# Patient Record
Sex: MALE | Race: WHITE | Employment: OTHER | URBAN - METROPOLITAN AREA
[De-identification: names, ages, dates, MRNs, and addresses within clinical notes are randomized per-mention and may not be internally consistent; named-entity substitution may affect disease eponyms.]

---

## 2017-01-04 ENCOUNTER — OFFICE VISIT (OUTPATIENT)
Dept: INTERNAL MEDICINE CLINIC | Age: 82
End: 2017-01-04

## 2017-01-04 VITALS
DIASTOLIC BLOOD PRESSURE: 74 MMHG | BODY MASS INDEX: 17 KG/M2 | HEIGHT: 65 IN | SYSTOLIC BLOOD PRESSURE: 120 MMHG | WEIGHT: 102 LBS

## 2017-01-04 DIAGNOSIS — E78.5 HYPERLIPIDEMIA, UNSPECIFIED HYPERLIPIDEMIA TYPE: ICD-10-CM

## 2017-01-04 DIAGNOSIS — M54.41 CHRONIC LOW BACK PAIN WITH RIGHT-SIDED SCIATICA, UNSPECIFIED BACK PAIN LATERALITY: ICD-10-CM

## 2017-01-04 DIAGNOSIS — J40 BRONCHITIS: ICD-10-CM

## 2017-01-04 DIAGNOSIS — I25.10 CORONARY ARTERY DISEASE INVOLVING NATIVE CORONARY ARTERY OF NATIVE HEART WITHOUT ANGINA PECTORIS: ICD-10-CM

## 2017-01-04 DIAGNOSIS — Z00.00 MEDICARE ANNUAL WELLNESS VISIT, INITIAL: Primary | ICD-10-CM

## 2017-01-04 DIAGNOSIS — J44.9 CHRONIC OBSTRUCTIVE PULMONARY DISEASE, UNSPECIFIED COPD TYPE (HCC): ICD-10-CM

## 2017-01-04 DIAGNOSIS — G89.29 CHRONIC LOW BACK PAIN WITH RIGHT-SIDED SCIATICA, UNSPECIFIED BACK PAIN LATERALITY: ICD-10-CM

## 2017-01-04 PROCEDURE — G0438 PPPS, INITIAL VISIT: HCPCS | Performed by: NURSE PRACTITIONER

## 2017-01-04 RX ORDER — PREDNISONE 10 MG/1
TABLET ORAL
Qty: 39 TABLET | Refills: 0 | Status: SHIPPED | OUTPATIENT
Start: 2017-01-04 | End: 2017-01-31 | Stop reason: ALTCHOICE

## 2017-01-04 ASSESSMENT — LIFESTYLE VARIABLES: HOW OFTEN DO YOU HAVE A DRINK CONTAINING ALCOHOL: 0

## 2017-01-18 ENCOUNTER — TELEPHONE (OUTPATIENT)
Dept: PULMONOLOGY | Age: 82
End: 2017-01-18

## 2017-01-24 ENCOUNTER — CARE COORDINATION (OUTPATIENT)
Dept: CARE COORDINATION | Age: 82
End: 2017-01-24

## 2017-01-30 ENCOUNTER — HOSPITAL ENCOUNTER (OUTPATIENT)
Dept: GENERAL RADIOLOGY | Age: 82
Discharge: HOME | End: 2017-01-30
Attending: INTERNAL MEDICINE | Admitting: INTERNAL MEDICINE

## 2017-01-30 LAB
A/G RATIO: 1.2 (ref 1.1–2.2)
ALBUMIN SERPL-MCNC: 4 G/DL (ref 3.4–5)
ALP BLD-CCNC: 69 U/L (ref 40–129)
ALT SERPL-CCNC: 15 U/L (ref 10–40)
ANION GAP SERPL CALCULATED.3IONS-SCNC: 15 MMOL/L (ref 3–16)
AST SERPL-CCNC: 19 U/L (ref 15–37)
BILIRUB SERPL-MCNC: 0.4 MG/DL (ref 0–1)
BUN BLDV-MCNC: 15 MG/DL (ref 7–20)
CALCIUM SERPL-MCNC: 9.7 MG/DL (ref 8.3–10.6)
CHLORIDE BLD-SCNC: 100 MMOL/L (ref 99–110)
CHOLESTEROL, TOTAL: 186 MG/DL (ref 0–199)
CO2: 27 MMOL/L (ref 21–32)
CREAT SERPL-MCNC: 0.8 MG/DL (ref 0.8–1.3)
GFR AFRICAN AMERICAN: >60
GFR NON-AFRICAN AMERICAN: >60
GLOBULIN: 3.3 G/DL
GLUCOSE BLD-MCNC: 91 MG/DL (ref 70–99)
HDLC SERPL-MCNC: 44 MG/DL (ref 40–60)
LDL CHOLESTEROL CALCULATED: 121 MG/DL
POTASSIUM SERPL-SCNC: 4.3 MMOL/L (ref 3.5–5.1)
SODIUM BLD-SCNC: 142 MMOL/L (ref 136–145)
T4 FREE: 1.3 NG/DL (ref 0.9–1.8)
TOTAL PROTEIN: 7.3 G/DL (ref 6.4–8.2)
TRIGL SERPL-MCNC: 104 MG/DL (ref 0–150)
TSH SERPL DL<=0.05 MIU/L-ACNC: 1.03 UIU/ML (ref 0.27–4.2)
VLDLC SERPL CALC-MCNC: 21 MG/DL

## 2017-01-31 ENCOUNTER — CARE COORDINATOR VISIT (OUTPATIENT)
Dept: CARE COORDINATION | Age: 82
End: 2017-01-31

## 2017-01-31 ENCOUNTER — OFFICE VISIT (OUTPATIENT)
Dept: INTERNAL MEDICINE CLINIC | Age: 82
End: 2017-01-31

## 2017-01-31 VITALS
WEIGHT: 103 LBS | SYSTOLIC BLOOD PRESSURE: 118 MMHG | HEART RATE: 75 BPM | BODY MASS INDEX: 17.16 KG/M2 | DIASTOLIC BLOOD PRESSURE: 58 MMHG | OXYGEN SATURATION: 95 % | HEIGHT: 65 IN

## 2017-01-31 DIAGNOSIS — I25.10 CORONARY ARTERY DISEASE INVOLVING NATIVE CORONARY ARTERY OF NATIVE HEART WITHOUT ANGINA PECTORIS: ICD-10-CM

## 2017-01-31 DIAGNOSIS — Z23 NEED FOR PROPHYLACTIC VACCINATION AGAINST STREPTOCOCCUS PNEUMONIAE (PNEUMOCOCCUS): ICD-10-CM

## 2017-01-31 DIAGNOSIS — F17.200 TOBACCO DEPENDENCE: ICD-10-CM

## 2017-01-31 DIAGNOSIS — E78.5 HYPERLIPIDEMIA, UNSPECIFIED HYPERLIPIDEMIA TYPE: ICD-10-CM

## 2017-01-31 DIAGNOSIS — M54.41 CHRONIC LOW BACK PAIN WITH RIGHT-SIDED SCIATICA, UNSPECIFIED BACK PAIN LATERALITY: ICD-10-CM

## 2017-01-31 DIAGNOSIS — G89.29 CHRONIC LOW BACK PAIN WITH RIGHT-SIDED SCIATICA, UNSPECIFIED BACK PAIN LATERALITY: ICD-10-CM

## 2017-01-31 DIAGNOSIS — J44.9 CHRONIC OBSTRUCTIVE PULMONARY DISEASE, UNSPECIFIED COPD TYPE (HCC): Primary | ICD-10-CM

## 2017-01-31 DIAGNOSIS — E04.1 THYROID NODULE: ICD-10-CM

## 2017-01-31 PROCEDURE — 99213 OFFICE O/P EST LOW 20 MIN: CPT | Performed by: INTERNAL MEDICINE

## 2017-01-31 PROCEDURE — G0009 ADMIN PNEUMOCOCCAL VACCINE: HCPCS | Performed by: INTERNAL MEDICINE

## 2017-01-31 PROCEDURE — 90732 PPSV23 VACC 2 YRS+ SUBQ/IM: CPT | Performed by: INTERNAL MEDICINE

## 2017-02-02 ENCOUNTER — HOSPITAL ENCOUNTER (OUTPATIENT)
Dept: ULTRASOUND IMAGING | Age: 82
Discharge: HOME | End: 2017-02-02
Attending: INTERNAL MEDICINE | Admitting: INTERNAL MEDICINE

## 2017-02-02 DIAGNOSIS — E04.1 THYROID NODULE: ICD-10-CM

## 2017-02-02 DIAGNOSIS — E04.1 NONTOXIC SINGLE THYROID NODULE: ICD-10-CM

## 2017-02-20 ENCOUNTER — CARE COORDINATION (OUTPATIENT)
Dept: CARE COORDINATION | Age: 82
End: 2017-02-20

## 2017-02-23 ENCOUNTER — CARE COORDINATION (OUTPATIENT)
Dept: CARE COORDINATION | Age: 82
End: 2017-02-23

## 2017-02-23 ENCOUNTER — OFFICE VISIT (OUTPATIENT)
Dept: INTERNAL MEDICINE CLINIC | Age: 82
End: 2017-02-23

## 2017-02-23 VITALS
DIASTOLIC BLOOD PRESSURE: 70 MMHG | BODY MASS INDEX: 17.66 KG/M2 | HEART RATE: 94 BPM | HEIGHT: 65 IN | SYSTOLIC BLOOD PRESSURE: 138 MMHG | WEIGHT: 106 LBS | OXYGEN SATURATION: 94 %

## 2017-02-23 DIAGNOSIS — M25.512 ACUTE PAIN OF LEFT SHOULDER: Primary | ICD-10-CM

## 2017-02-23 DIAGNOSIS — I25.10 CORONARY ARTERY DISEASE INVOLVING NATIVE CORONARY ARTERY OF NATIVE HEART WITHOUT ANGINA PECTORIS: ICD-10-CM

## 2017-02-23 DIAGNOSIS — J44.1 COPD WITH ACUTE EXACERBATION (HCC): ICD-10-CM

## 2017-02-23 PROCEDURE — 99213 OFFICE O/P EST LOW 20 MIN: CPT | Performed by: INTERNAL MEDICINE

## 2017-02-23 RX ORDER — METHYLPREDNISOLONE 4 MG/1
TABLET ORAL
Qty: 1 KIT | Refills: 0 | Status: SHIPPED | OUTPATIENT
Start: 2017-02-23 | End: 2017-03-01

## 2017-02-23 RX ORDER — MELOXICAM 15 MG/1
15 TABLET ORAL DAILY
Qty: 30 TABLET | Refills: 0 | Status: SHIPPED | OUTPATIENT
Start: 2017-02-23 | End: 2017-05-01 | Stop reason: SDUPTHER

## 2017-03-01 ENCOUNTER — TELEPHONE (OUTPATIENT)
Dept: ORTHOPEDIC SURGERY | Age: 82
End: 2017-03-01

## 2017-03-17 ENCOUNTER — CARE COORDINATION (OUTPATIENT)
Dept: CARE COORDINATION | Age: 82
End: 2017-03-17

## 2017-04-26 ENCOUNTER — CARE COORDINATION (OUTPATIENT)
Dept: CARE COORDINATION | Age: 82
End: 2017-04-26

## 2017-05-01 ENCOUNTER — OFFICE VISIT (OUTPATIENT)
Dept: INTERNAL MEDICINE CLINIC | Age: 82
End: 2017-05-01

## 2017-05-01 VITALS
DIASTOLIC BLOOD PRESSURE: 68 MMHG | WEIGHT: 103 LBS | HEART RATE: 70 BPM | BODY MASS INDEX: 17.16 KG/M2 | OXYGEN SATURATION: 96 % | SYSTOLIC BLOOD PRESSURE: 110 MMHG | HEIGHT: 65 IN

## 2017-05-01 DIAGNOSIS — M54.16 LUMBAR RADICULOPATHY, ACUTE: Primary | ICD-10-CM

## 2017-05-01 DIAGNOSIS — F17.200 TOBACCO DEPENDENCE: ICD-10-CM

## 2017-05-01 DIAGNOSIS — E78.5 HYPERLIPIDEMIA, UNSPECIFIED HYPERLIPIDEMIA TYPE: ICD-10-CM

## 2017-05-01 DIAGNOSIS — J44.9 CHRONIC OBSTRUCTIVE PULMONARY DISEASE, UNSPECIFIED COPD TYPE (HCC): ICD-10-CM

## 2017-05-01 DIAGNOSIS — I25.10 CORONARY ARTERY DISEASE INVOLVING NATIVE CORONARY ARTERY OF NATIVE HEART WITHOUT ANGINA PECTORIS: ICD-10-CM

## 2017-05-01 PROCEDURE — 99214 OFFICE O/P EST MOD 30 MIN: CPT | Performed by: INTERNAL MEDICINE

## 2017-05-01 RX ORDER — MELOXICAM 15 MG/1
15 TABLET ORAL DAILY
Qty: 30 TABLET | Refills: 1 | Status: SHIPPED | OUTPATIENT
Start: 2017-05-01 | End: 2018-04-17

## 2017-05-01 RX ORDER — PREDNISONE 10 MG/1
TABLET ORAL
Qty: 40 TABLET | Refills: 0 | Status: SHIPPED | OUTPATIENT
Start: 2017-05-01 | End: 2017-07-14 | Stop reason: ALTCHOICE

## 2017-07-14 ENCOUNTER — HOSPITAL ENCOUNTER (OUTPATIENT)
Dept: OTHER | Age: 82
Discharge: HOME | End: 2017-07-14
Attending: INTERNAL MEDICINE | Admitting: INTERNAL MEDICINE

## 2017-07-14 ENCOUNTER — OFFICE VISIT (OUTPATIENT)
Dept: PULMONOLOGY | Age: 82
End: 2017-07-14

## 2017-07-14 VITALS
DIASTOLIC BLOOD PRESSURE: 61 MMHG | RESPIRATION RATE: 16 BRPM | HEART RATE: 72 BPM | WEIGHT: 98 LBS | SYSTOLIC BLOOD PRESSURE: 116 MMHG | OXYGEN SATURATION: 95 % | BODY MASS INDEX: 16.33 KG/M2 | TEMPERATURE: 97 F | HEIGHT: 65 IN

## 2017-07-14 DIAGNOSIS — M25.559 ARTHRALGIA OF HIP, UNSPECIFIED LATERALITY: ICD-10-CM

## 2017-07-14 DIAGNOSIS — J43.2 CENTRILOBULAR EMPHYSEMA (HCC): ICD-10-CM

## 2017-07-14 DIAGNOSIS — M25.559 ARTHRALGIA OF HIP, UNSPECIFIED LATERALITY: Primary | ICD-10-CM

## 2017-07-14 PROCEDURE — 99214 OFFICE O/P EST MOD 30 MIN: CPT | Performed by: INTERNAL MEDICINE

## 2017-08-01 ENCOUNTER — OFFICE VISIT (OUTPATIENT)
Dept: INTERNAL MEDICINE CLINIC | Age: 82
End: 2017-08-01

## 2017-08-01 VITALS
OXYGEN SATURATION: 95 % | HEIGHT: 65 IN | HEART RATE: 71 BPM | BODY MASS INDEX: 16.43 KG/M2 | DIASTOLIC BLOOD PRESSURE: 50 MMHG | WEIGHT: 98.6 LBS | SYSTOLIC BLOOD PRESSURE: 100 MMHG

## 2017-08-01 DIAGNOSIS — I25.10 CORONARY ARTERY DISEASE INVOLVING NATIVE CORONARY ARTERY OF NATIVE HEART WITHOUT ANGINA PECTORIS: ICD-10-CM

## 2017-08-01 DIAGNOSIS — K59.00 CONSTIPATION, UNSPECIFIED CONSTIPATION TYPE: ICD-10-CM

## 2017-08-01 DIAGNOSIS — J44.9 CHRONIC OBSTRUCTIVE PULMONARY DISEASE, UNSPECIFIED COPD TYPE (HCC): Primary | ICD-10-CM

## 2017-08-01 DIAGNOSIS — E78.5 HYPERLIPIDEMIA, UNSPECIFIED HYPERLIPIDEMIA TYPE: ICD-10-CM

## 2017-08-01 DIAGNOSIS — F17.200 TOBACCO DEPENDENCE: ICD-10-CM

## 2017-08-01 DIAGNOSIS — K21.9 GASTROESOPHAGEAL REFLUX DISEASE WITHOUT ESOPHAGITIS: ICD-10-CM

## 2017-08-01 DIAGNOSIS — M54.41 ACUTE RIGHT-SIDED LOW BACK PAIN WITH RIGHT-SIDED SCIATICA: ICD-10-CM

## 2017-08-01 PROCEDURE — 99214 OFFICE O/P EST MOD 30 MIN: CPT | Performed by: INTERNAL MEDICINE

## 2017-08-04 ENCOUNTER — HOSPITAL ENCOUNTER (OUTPATIENT)
Dept: GENERAL RADIOLOGY | Age: 82
Discharge: HOME | End: 2017-08-04
Attending: INTERNAL MEDICINE | Admitting: INTERNAL MEDICINE

## 2017-08-04 LAB
A/G RATIO: 1.3 (ref 1.1–2.2)
ALBUMIN SERPL-MCNC: 4.1 G/DL (ref 3.4–5)
ALP BLD-CCNC: 59 U/L (ref 40–129)
ALT SERPL-CCNC: 10 U/L (ref 10–40)
ANION GAP SERPL CALCULATED.3IONS-SCNC: 12 MMOL/L (ref 3–16)
AST SERPL-CCNC: 15 U/L (ref 15–37)
BILIRUB SERPL-MCNC: 0.4 MG/DL (ref 0–1)
BUN BLDV-MCNC: 17 MG/DL (ref 7–20)
CALCIUM SERPL-MCNC: 9.1 MG/DL (ref 8.3–10.6)
CHLORIDE BLD-SCNC: 101 MMOL/L (ref 99–110)
CHOLESTEROL, TOTAL: 185 MG/DL (ref 0–199)
CO2: 27 MMOL/L (ref 21–32)
CREAT SERPL-MCNC: 0.7 MG/DL (ref 0.8–1.3)
GFR AFRICAN AMERICAN: >60
GFR NON-AFRICAN AMERICAN: >60
GLOBULIN: 3.2 G/DL
GLUCOSE BLD-MCNC: 96 MG/DL (ref 70–99)
HDLC SERPL-MCNC: 39 MG/DL (ref 40–60)
LDL CHOLESTEROL CALCULATED: 127 MG/DL
POTASSIUM SERPL-SCNC: 4.2 MMOL/L (ref 3.5–5.1)
SODIUM BLD-SCNC: 140 MMOL/L (ref 136–145)
TOTAL PROTEIN: 7.3 G/DL (ref 6.4–8.2)
TRIGL SERPL-MCNC: 96 MG/DL (ref 0–150)
VLDLC SERPL CALC-MCNC: 19 MG/DL

## 2017-08-14 ENCOUNTER — OFFICE VISIT (OUTPATIENT)
Dept: ORTHOPEDIC SURGERY | Age: 82
End: 2017-08-14

## 2017-08-14 VITALS — WEIGHT: 98.55 LBS | HEIGHT: 65 IN | BODY MASS INDEX: 16.42 KG/M2

## 2017-08-14 DIAGNOSIS — M51.36 DDD (DEGENERATIVE DISC DISEASE), LUMBAR: ICD-10-CM

## 2017-08-14 DIAGNOSIS — M54.16 LUMBAR RADICULITIS: ICD-10-CM

## 2017-08-14 DIAGNOSIS — M54.5 LOW BACK PAIN, UNSPECIFIED BACK PAIN LATERALITY, UNSPECIFIED CHRONICITY, WITH SCIATICA PRESENCE UNSPECIFIED: Primary | ICD-10-CM

## 2017-08-14 PROCEDURE — 99214 OFFICE O/P EST MOD 30 MIN: CPT | Performed by: PHYSICIAN ASSISTANT

## 2017-08-14 PROCEDURE — 72100 X-RAY EXAM L-S SPINE 2/3 VWS: CPT | Performed by: PHYSICIAN ASSISTANT

## 2017-08-18 ENCOUNTER — TELEPHONE (OUTPATIENT)
Dept: ORTHOPEDIC SURGERY | Age: 82
End: 2017-08-18

## 2017-08-21 ENCOUNTER — HOSPITAL ENCOUNTER (OUTPATIENT)
Dept: PAIN MANAGEMENT | Age: 82
Discharge: HOME | End: 2017-08-21
Attending: PHYSICAL MEDICINE & REHABILITATION | Admitting: PHYSICAL MEDICINE & REHABILITATION

## 2017-08-21 VITALS
SYSTOLIC BLOOD PRESSURE: 135 MMHG | HEART RATE: 62 BPM | DIASTOLIC BLOOD PRESSURE: 59 MMHG | RESPIRATION RATE: 16 BRPM | OXYGEN SATURATION: 98 % | TEMPERATURE: 97.6 F

## 2017-08-21 ASSESSMENT — PAIN - FUNCTIONAL ASSESSMENT: PAIN_FUNCTIONAL_ASSESSMENT: 0-10

## 2017-08-21 ASSESSMENT — ACTIVITIES OF DAILY LIVING (ADL): EFFECT OF PAIN ON DAILY ACTIVITIES: LIFTING

## 2017-08-21 ASSESSMENT — PAIN DESCRIPTION - DESCRIPTORS: DESCRIPTORS: ACHING;SHOOTING;SHARP;CONSTANT

## 2017-11-07 ENCOUNTER — CARE COORDINATION (OUTPATIENT)
Dept: CARE COORDINATION | Age: 82
End: 2017-11-07

## 2017-11-07 NOTE — CARE COORDINATION
Ambulatory Care Coordination Note  11/7/2017  CM Risk Score: 1  Alma Rosa Mortality Risk Score: 17.47    ACC: Damaris Kidd, RN    Summary Note: ACC spoke with patient and his wife today for outreach. Sami Jimenez reports he is doing well. Copd stable. Manages his own medications. He continues to drive. He would like to have some information on lifeline for him and his wife. He realized it maybe beneficial with his wife's recent fall. No home services indicated or requested at this time. Past Medical History:   Diagnosis Date    Arthritis     CAD (coronary artery disease) 1990    CABG    [facility]     Emphysema [facility] lung [facility] [address]     Hyperlipidemia     Pneumonia      Plan     Ongoing care coordination  Monthly outreach calls  Lifeline information to be mailed. Copd zones to be reinforced.       COPD Assessment    Does the patient understand envrionmental exposure?:  Yes  Is the patient able to verbalize Rescue vs. Long Acting medications?:  Yes  Does the patient have a nebulizer?:  Yes  Does the patient use a space with inhaled medications?:  No            Symptoms:             Care Coordination Interventions    Program Enrollment:  Rising Risk  Referral from Primary Care Provider:  No  Suggested Interventions and Community Resources         Goals Addressed             Most Recent     Care Coordination Self Management   On track (11/7/2017)             CC Self Management Goals   Patient Goal (What steps will patient take to achieve goal?): Alternate activity with rest periods prn  Patient is able to discuss self-management of condition(s):  Pt demonstrates adherence to medications  Pt demonstrates understanding of self-monitoring  Patient is able to identify Red Flags:  Alert to potential adverse drug reactions(s) or side effects and actions to take should they arise  Discuss target symptoms and actions to take should they arise  Identify problems that require immediate PCP or

## 2017-11-07 NOTE — PATIENT INSTRUCTIONS
not have an account, please click on the \"Sign Up Now\" link. Current as of: March 25, 2017  Content Version: 11.3  © 8641-9274 [employer]. Care instructions adapted under license by [facility] ([facility]). If you have questions about a medical condition or this instruction, always ask your healthcare professional. [address] any warranty or liability for your use of this information.

## 2018-01-05 ENCOUNTER — OFFICE VISIT (OUTPATIENT)
Dept: INTERNAL MEDICINE CLINIC | Age: 83
End: 2018-01-05

## 2018-01-05 VITALS
BODY MASS INDEX: 16.66 KG/M2 | SYSTOLIC BLOOD PRESSURE: 118 MMHG | TEMPERATURE: 98.1 F | WEIGHT: 100 LBS | HEART RATE: 76 BPM | OXYGEN SATURATION: 94 % | DIASTOLIC BLOOD PRESSURE: 60 MMHG

## 2018-01-05 DIAGNOSIS — J44.9 CHRONIC OBSTRUCTIVE PULMONARY DISEASE, UNSPECIFIED COPD TYPE (HCC): ICD-10-CM

## 2018-01-05 DIAGNOSIS — I25.10 CORONARY ARTERY DISEASE INVOLVING NATIVE CORONARY ARTERY OF NATIVE HEART WITHOUT ANGINA PECTORIS: ICD-10-CM

## 2018-01-05 DIAGNOSIS — J20.9 ACUTE BRONCHITIS, UNSPECIFIED ORGANISM: Primary | ICD-10-CM

## 2018-01-05 DIAGNOSIS — E78.5 HYPERLIPIDEMIA, UNSPECIFIED HYPERLIPIDEMIA TYPE: ICD-10-CM

## 2018-01-05 PROCEDURE — G8418 CALC BMI BLW LOW PARAM F/U: HCPCS | Performed by: INTERNAL MEDICINE

## 2018-01-05 PROCEDURE — G8926 SPIRO NO PERF OR DOC: HCPCS | Performed by: INTERNAL MEDICINE

## 2018-01-05 PROCEDURE — G8598 ASA/ANTIPLAT THER USED: HCPCS | Performed by: INTERNAL MEDICINE

## 2018-01-05 PROCEDURE — 99213 OFFICE O/P EST LOW 20 MIN: CPT | Performed by: INTERNAL MEDICINE

## 2018-01-05 PROCEDURE — 1036F TOBACCO NON-USER: CPT | Performed by: INTERNAL MEDICINE

## 2018-01-05 PROCEDURE — G8427 DOCREV CUR MEDS BY ELIG CLIN: HCPCS | Performed by: INTERNAL MEDICINE

## 2018-01-05 PROCEDURE — 1123F ACP DISCUSS/DSCN MKR DOCD: CPT | Performed by: INTERNAL MEDICINE

## 2018-01-05 PROCEDURE — 4040F PNEUMOC VAC/ADMIN/RCVD: CPT | Performed by: INTERNAL MEDICINE

## 2018-01-05 PROCEDURE — G8484 FLU IMMUNIZE NO ADMIN: HCPCS | Performed by: INTERNAL MEDICINE

## 2018-01-05 PROCEDURE — 3023F SPIROM DOC REV: CPT | Performed by: INTERNAL MEDICINE

## 2018-01-05 RX ORDER — AZITHROMYCIN 250 MG/1
TABLET, FILM COATED ORAL
Qty: 1 PACKET | Refills: 0 | Status: SHIPPED | OUTPATIENT
Start: 2018-01-05 | End: 2018-01-10 | Stop reason: ALTCHOICE

## 2018-01-05 RX ORDER — BENZONATATE 100 MG/1
100 CAPSULE ORAL 3 TIMES DAILY PRN
Qty: 15 CAPSULE | Refills: 0 | Status: SHIPPED | OUTPATIENT
Start: 2018-01-05 | End: 2018-01-17 | Stop reason: ALTCHOICE

## 2018-01-10 ENCOUNTER — OFFICE VISIT (OUTPATIENT)
Dept: INTERNAL MEDICINE CLINIC | Age: 83
End: 2018-01-10

## 2018-01-10 VITALS
BODY MASS INDEX: 16.76 KG/M2 | OXYGEN SATURATION: 95 % | DIASTOLIC BLOOD PRESSURE: 70 MMHG | SYSTOLIC BLOOD PRESSURE: 130 MMHG | TEMPERATURE: 97.6 F | HEIGHT: 65 IN | HEART RATE: 84 BPM | WEIGHT: 100.6 LBS

## 2018-01-10 DIAGNOSIS — J44.9 CHRONIC OBSTRUCTIVE PULMONARY DISEASE, UNSPECIFIED COPD TYPE (HCC): ICD-10-CM

## 2018-01-10 DIAGNOSIS — F17.200 TOBACCO DEPENDENCE: ICD-10-CM

## 2018-01-10 DIAGNOSIS — G89.29 CHRONIC LOW BACK PAIN WITH RIGHT-SIDED SCIATICA, UNSPECIFIED BACK PAIN LATERALITY: ICD-10-CM

## 2018-01-10 DIAGNOSIS — E78.5 HYPERLIPIDEMIA, UNSPECIFIED HYPERLIPIDEMIA TYPE: ICD-10-CM

## 2018-01-10 DIAGNOSIS — M54.41 CHRONIC LOW BACK PAIN WITH RIGHT-SIDED SCIATICA, UNSPECIFIED BACK PAIN LATERALITY: ICD-10-CM

## 2018-01-10 DIAGNOSIS — E04.1 THYROID NODULE: ICD-10-CM

## 2018-01-10 DIAGNOSIS — Z00.00 MEDICARE ANNUAL WELLNESS VISIT, SUBSEQUENT: Primary | ICD-10-CM

## 2018-01-10 DIAGNOSIS — Z00.00 ROUTINE GENERAL MEDICAL EXAMINATION AT A HEALTH CARE FACILITY: ICD-10-CM

## 2018-01-10 PROCEDURE — G8598 ASA/ANTIPLAT THER USED: HCPCS | Performed by: NURSE PRACTITIONER

## 2018-01-10 PROCEDURE — G0439 PPPS, SUBSEQ VISIT: HCPCS | Performed by: NURSE PRACTITIONER

## 2018-01-10 ASSESSMENT — PATIENT HEALTH QUESTIONNAIRE - PHQ9: SUM OF ALL RESPONSES TO PHQ QUESTIONS 1-9: 2

## 2018-01-10 ASSESSMENT — LIFESTYLE VARIABLES: HOW OFTEN DO YOU HAVE A DRINK CONTAINING ALCOHOL: 0

## 2018-01-10 ASSESSMENT — ANXIETY QUESTIONNAIRES: GAD7 TOTAL SCORE: 1

## 2018-01-10 NOTE — PROGRESS NOTES
per week?: Yes  Have you lost any weight without trying in the past 3 months?: (!) Yes (He has lost About 30 lbs in last two years,has been checked,can not find out why.)  Do you eat fewer than 2 meals per day?: No  Have you seen a dentist within the past year?: (!) No (DENTURES)  Body mass index is 16.74 kg/m². Health Habits/Nutrition Interventions:  · None indicated    Safety:  Safety  Do you have working smoke detectors?: Yes  Have all throw rugs been removed or fastened?: Yes  Do you have non-slip mats in all bathtubs?: Yes  Do all of your stairways have a railing or banister?: (!) No (No Stairs)  Are your doorways, halls and stairs free of clutter?: Yes  Do you always fasten your seatbelt when you are in a car?: Yes  Safety Interventions:  · None indicated      Personalized Preventive Plan   Current Health Maintenance Status  Immunization History   Administered Date(s) Administered    Influenza Vaccine, unspecified formulation 10/01/2016    Influenza, High Dose 10/30/2014, 11/13/2015    Pneumococcal 13-valent Conjugate ([id number]) 09/24/2015    Pneumococcal Polysaccharide ([id number]) 01/31/2017    Td 05/07/2015        Health Maintenance   Topic Date Due    Zostavax vaccine  02/25/1992    DTaP/Tdap/Td vaccine (1 - Tdap) 05/08/2015    Flu vaccine (1) 09/01/2017    Pneumococcal low/med risk  Completed     Recommendations for Preventive Services Due: see orders.   Recommended screening schedule for the next 5-10 years is provided to the patient in written form: see Patient Instructions/AVS.

## 2018-01-17 ENCOUNTER — OFFICE VISIT (OUTPATIENT)
Dept: PULMONOLOGY | Age: 83
End: 2018-01-17

## 2018-01-17 VITALS
SYSTOLIC BLOOD PRESSURE: 124 MMHG | HEART RATE: 68 BPM | RESPIRATION RATE: 18 BRPM | DIASTOLIC BLOOD PRESSURE: 72 MMHG | OXYGEN SATURATION: 94 % | WEIGHT: 103 LBS | BODY MASS INDEX: 17.16 KG/M2 | HEIGHT: 65 IN | TEMPERATURE: 98 F

## 2018-01-17 DIAGNOSIS — J44.9 CHRONIC OBSTRUCTIVE PULMONARY DISEASE, UNSPECIFIED COPD TYPE (HCC): Primary | ICD-10-CM

## 2018-01-17 PROCEDURE — 1123F ACP DISCUSS/DSCN MKR DOCD: CPT | Performed by: INTERNAL MEDICINE

## 2018-01-17 PROCEDURE — G8484 FLU IMMUNIZE NO ADMIN: HCPCS | Performed by: INTERNAL MEDICINE

## 2018-01-17 PROCEDURE — G8926 SPIRO NO PERF OR DOC: HCPCS | Performed by: INTERNAL MEDICINE

## 2018-01-17 PROCEDURE — 99213 OFFICE O/P EST LOW 20 MIN: CPT | Performed by: INTERNAL MEDICINE

## 2018-01-17 PROCEDURE — G8418 CALC BMI BLW LOW PARAM F/U: HCPCS | Performed by: INTERNAL MEDICINE

## 2018-01-17 PROCEDURE — 3023F SPIROM DOC REV: CPT | Performed by: INTERNAL MEDICINE

## 2018-01-17 PROCEDURE — G8427 DOCREV CUR MEDS BY ELIG CLIN: HCPCS | Performed by: INTERNAL MEDICINE

## 2018-01-17 PROCEDURE — 4004F PT TOBACCO SCREEN RCVD TLK: CPT | Performed by: INTERNAL MEDICINE

## 2018-01-17 PROCEDURE — 4040F PNEUMOC VAC/ADMIN/RCVD: CPT | Performed by: INTERNAL MEDICINE

## 2018-01-17 PROCEDURE — G8598 ASA/ANTIPLAT THER USED: HCPCS | Performed by: INTERNAL MEDICINE

## 2018-01-17 NOTE — PROGRESS NOTES
imaging. PFT 3-4-16 FEV1 2.09 L 60% % DLCO 7.05 31%    Assessment:  Severe COPD, upper lobe predominant centrilobular emphysema     Not addressed today  Weight loss, stable at 100#  Chronic prednisone use, concern for osteoporosis  Coronary Artery Disease s/p CABG  Ischemic cardiomyopathy with EF 35%  Osteopenia  Thyroid nodules followed by Dr. Ed Anders:   · Stiolto daily  · Continue PRN Duonebs for now  · Currently tobacco free    · I recommended routine follow-up be with Dr. benitez. I would like him to continue on stiolto or a similar medication to help prevent further deterioration of his lung disease and to help prevent exacerbations.

## 2018-01-17 NOTE — Clinical Note
Hi Dr. benitez, I saw your patient today in the pulmonary clinic. He is doing well and has been doing pretty well overall for some time. I have him on stiolto which seems like a good choice overall. I'd like him to continue this. But otherwise I don't anticipate having any additional recommendations unless something changes. I recommended that future follow-up be with you unless you have some other concern.    Thanks, [facility]

## 2018-02-05 ENCOUNTER — TELEPHONE (OUTPATIENT)
Dept: INTERNAL MEDICINE CLINIC | Age: 83
End: 2018-02-05

## 2018-02-05 RX ORDER — ATORVASTATIN CALCIUM 80 MG/1
TABLET, FILM COATED ORAL
Qty: 90 TABLET | Refills: 3 | Status: SHIPPED | OUTPATIENT
Start: 2018-02-05 | End: 2019-06-20 | Stop reason: SDUPTHER

## 2018-02-08 ENCOUNTER — CARE COORDINATION (OUTPATIENT)
Dept: CARE COORDINATION | Age: 83
End: 2018-02-08

## 2018-03-13 ENCOUNTER — OFFICE VISIT (OUTPATIENT)
Dept: ORTHOPEDIC SURGERY | Age: 83
End: 2018-03-13

## 2018-03-13 VITALS
HEART RATE: 83 BPM | WEIGHT: 102.95 LBS | HEIGHT: 65 IN | SYSTOLIC BLOOD PRESSURE: 126 MMHG | DIASTOLIC BLOOD PRESSURE: 58 MMHG | BODY MASS INDEX: 17.15 KG/M2

## 2018-03-13 DIAGNOSIS — M48.061 LUMBAR FORAMINAL STENOSIS: ICD-10-CM

## 2018-03-13 DIAGNOSIS — M51.36 DDD (DEGENERATIVE DISC DISEASE), LUMBAR: Primary | ICD-10-CM

## 2018-03-13 DIAGNOSIS — M54.16 LUMBAR RADICULITIS: ICD-10-CM

## 2018-03-13 PROCEDURE — 99214 OFFICE O/P EST MOD 30 MIN: CPT | Performed by: PHYSICIAN ASSISTANT

## 2018-03-13 PROCEDURE — G8427 DOCREV CUR MEDS BY ELIG CLIN: HCPCS | Performed by: PHYSICIAN ASSISTANT

## 2018-03-13 PROCEDURE — 4004F PT TOBACCO SCREEN RCVD TLK: CPT | Performed by: PHYSICIAN ASSISTANT

## 2018-03-13 PROCEDURE — 4040F PNEUMOC VAC/ADMIN/RCVD: CPT | Performed by: PHYSICIAN ASSISTANT

## 2018-03-13 PROCEDURE — G8419 CALC BMI OUT NRM PARAM NOF/U: HCPCS | Performed by: PHYSICIAN ASSISTANT

## 2018-03-13 PROCEDURE — G8482 FLU IMMUNIZE ORDER/ADMIN: HCPCS | Performed by: PHYSICIAN ASSISTANT

## 2018-03-13 PROCEDURE — G8598 ASA/ANTIPLAT THER USED: HCPCS | Performed by: PHYSICIAN ASSISTANT

## 2018-03-13 PROCEDURE — 1123F ACP DISCUSS/DSCN MKR DOCD: CPT | Performed by: PHYSICIAN ASSISTANT

## 2018-03-13 RX ORDER — METHYLPREDNISOLONE 4 MG/1
TABLET ORAL
Qty: 1 KIT | Refills: 0 | Status: SHIPPED | OUTPATIENT
Start: 2018-03-13 | End: 2018-03-19

## 2018-03-13 NOTE — PROGRESS NOTES
Follow up: SPINE    CHIEF COMPLAINT:    Chief Complaint   Patient presents with    Pain     BACK PAIN        HISTORY OF PRESENT ILLNESS:                The patient is a 80 y.o. male history of remote L4 5 hemilaminectomy with Dr. Sherlyn Grimaldo 20+ years prior last seen for right L5-S1 FALLS [facility] August 2017 reports a 6 month history of worsening aching and stabbing right low back/buttock pain radiating to the posterior thigh/calf to the foot with burning and numbness. Symptoms are increased with walking and standing. Relief with sitting and resting. He states previous JESSICA granted good improvement for approximately 3-4 weeks. Other conservative care includes NSAIDs, tramadol, PT with aggravation. He denies any progressive extremity weakness or recent bowel or bladder changes. No recent trauma. Past/Current Treatment     PT: YES WITH AGGRAVATION   Chiro:no  Injections: R L5-S1 TX JESSICA 8-2017--3wks relief   Medications: NSAIDs, Tramadol--sparingly w/PCP  Surgery: S/p R L4-5 M[facility] Dr. Saray Wen prior    Past Medical History: Medical history form was reviewed and scanned into the Media tab  Past Medical History:   Diagnosis Date    Arthritis     CAD (coronary artery disease) 1990    CABG    [facility]) [facility] [address]     Emphysema of lung ([facility] [address]     Hyperlipidemia     Osteoarthritis     Pneumonia         REVIEW OF SYSTEMS:   CONSTITUTIONAL: Denies unexplained weight loss, fevers, chills or fatigue  NEUROLOGIC: Denies tremors or seizures         PHYSICAL EXAM:    Vitals: Blood pressure (!) 126/58, pulse 83, height 5' 5\" (1.651 m), weight 102 lb 15.3 oz (46.7 kg). GENERAL EXAM:  · General Apparence: Patient is adequately groomed with no evidence of malnutrition. · Orientation: The patient is oriented to time, place and person.    · Mood & Affect:The patient's mood and affect are appropriate   · Lymphatic: The lymphatic examination bilaterally reveals all areas to be without enlargement or induration  · Sensation: Sensation is intact without deficit  · Coordination/Balance: Good coordination   ·   LUMBAR/SACRAL EXAMINATION:  · Inspection: Local inspection shows no step-off or bruising. Lumbar alignment is normal.  Sagittal and Coronal balance is neutral.      · Palpation:   No evidence of tenderness at the midline. No tenderness bilaterally at the paraspinal or trochanters. There is no step-off or paraspinal spasm. · Range of Motion: Moderate loss flexion and extension--both reproduce his right leg pain   · Strength:   Strength testing is 5/5 in all muscle groups tested; right hamstrings 4/5. · Special Tests:   Straight leg raise and crossed SLR negative. Leg length and pelvis level.  0 out of 5 Patricia's signs. · Skin: There are no rashes, ulcerations or lesions. · Reflexes: Reflexes are symmetrically 1+ at the patellar and ankle tendons. Clonus absent bilaterally at the feet. · Gait & station: forward flexed unassisted   · Additional Examinations:   · RIGHT LOWER EXTREMITY: Inspection/examination of the right lower extremity does not show any tenderness, deformity or injury. Range of motion is full. There is no gross instability. There are no rashes, ulcerations or lesions. · LEFT LOWER EXTREMITY:  Inspection/examination of the left lower extremity does not show any tenderness, deformity or injury. Range of motion is full. There is no gross instability. There are no rashes, ulcerations or lesions.   Strength and tone are normal.    Diagnostic Testin views lumbar spine 2017 show scoliosis with multilevel DDD/spondylosis, L5-S1 spondylolisthesis with DDD, lateral view is skewed due to scoliotic curve there does appear to be wedging T10     Lumbar MRI November 15, 2016 shows L5-S1 spondylolisthesis, multilevel DDD/spondylosis moderate to severe right foraminal stenosis L4 5, severe right foraminal stenosis L5-S1, prior L4 5 right hemilaminotomy, lateral recess

## 2018-03-19 ENCOUNTER — TELEPHONE (OUTPATIENT)
Dept: ORTHOPEDIC SURGERY | Age: 83
End: 2018-03-19

## 2018-04-05 ENCOUNTER — HOSPITAL ENCOUNTER (OUTPATIENT)
Dept: MRI IMAGING | Age: 83
Discharge: HOME | End: 2018-04-05

## 2018-04-05 DIAGNOSIS — M54.16 LUMBAR RADICULITIS: ICD-10-CM

## 2018-04-05 DIAGNOSIS — M48.061 LUMBAR FORAMINAL STENOSIS: ICD-10-CM

## 2018-04-05 DIAGNOSIS — M51.36 DDD (DEGENERATIVE DISC DISEASE), LUMBAR: ICD-10-CM

## 2018-04-10 ENCOUNTER — OFFICE VISIT (OUTPATIENT)
Dept: ORTHOPEDIC SURGERY | Age: 83
End: 2018-04-10

## 2018-04-10 VITALS
BODY MASS INDEX: 17.15 KG/M2 | WEIGHT: 102.95 LBS | DIASTOLIC BLOOD PRESSURE: 101 MMHG | SYSTOLIC BLOOD PRESSURE: 138 MMHG | HEART RATE: 76 BPM | HEIGHT: 65 IN

## 2018-04-10 DIAGNOSIS — M54.16 LUMBAR RADICULITIS: ICD-10-CM

## 2018-04-10 DIAGNOSIS — M48.061 LUMBAR FORAMINAL STENOSIS: ICD-10-CM

## 2018-04-10 DIAGNOSIS — M51.36 DDD (DEGENERATIVE DISC DISEASE), LUMBAR: Primary | ICD-10-CM

## 2018-04-10 PROCEDURE — 99214 OFFICE O/P EST MOD 30 MIN: CPT | Performed by: PHYSICIAN ASSISTANT

## 2018-04-10 PROCEDURE — G8427 DOCREV CUR MEDS BY ELIG CLIN: HCPCS | Performed by: PHYSICIAN ASSISTANT

## 2018-04-10 PROCEDURE — G8598 ASA/ANTIPLAT THER USED: HCPCS | Performed by: PHYSICIAN ASSISTANT

## 2018-04-10 PROCEDURE — 4040F PNEUMOC VAC/ADMIN/RCVD: CPT | Performed by: PHYSICIAN ASSISTANT

## 2018-04-10 PROCEDURE — G8419 CALC BMI OUT NRM PARAM NOF/U: HCPCS | Performed by: PHYSICIAN ASSISTANT

## 2018-04-10 PROCEDURE — 1123F ACP DISCUSS/DSCN MKR DOCD: CPT | Performed by: PHYSICIAN ASSISTANT

## 2018-04-10 PROCEDURE — 4004F PT TOBACCO SCREEN RCVD TLK: CPT | Performed by: PHYSICIAN ASSISTANT

## 2018-04-11 ENCOUNTER — TELEPHONE (OUTPATIENT)
Dept: ORTHOPEDIC SURGERY | Age: 83
End: 2018-04-11

## 2018-04-13 ENCOUNTER — PAT TELEPHONE (OUTPATIENT)
Dept: PREADMISSION TESTING | Age: 83
End: 2018-04-13

## 2018-04-17 ENCOUNTER — HOSPITAL ENCOUNTER (OUTPATIENT)
Dept: PAIN MANAGEMENT | Age: 83
Discharge: HOME | End: 2018-04-17
Attending: PHYSICAL MEDICINE & REHABILITATION | Admitting: PHYSICAL MEDICINE & REHABILITATION

## 2018-04-17 VITALS
OXYGEN SATURATION: 93 % | HEIGHT: 65 IN | HEART RATE: 98 BPM | SYSTOLIC BLOOD PRESSURE: 135 MMHG | BODY MASS INDEX: 16.66 KG/M2 | TEMPERATURE: 99.4 F | RESPIRATION RATE: 20 BRPM | WEIGHT: 100 LBS | DIASTOLIC BLOOD PRESSURE: 57 MMHG

## 2018-04-17 ASSESSMENT — ACTIVITIES OF DAILY LIVING (ADL): EFFECT OF PAIN ON DAILY ACTIVITIES: ANY ACTIVITY

## 2018-04-17 ASSESSMENT — PAIN - FUNCTIONAL ASSESSMENT
PAIN_FUNCTIONAL_ASSESSMENT: 0-10
PAIN_FUNCTIONAL_ASSESSMENT: 0-10

## 2018-04-17 ASSESSMENT — PAIN DESCRIPTION - DESCRIPTORS: DESCRIPTORS: SHARP

## 2018-05-01 ENCOUNTER — OFFICE VISIT (OUTPATIENT)
Dept: ORTHOPEDIC SURGERY | Age: 83
End: 2018-05-01

## 2018-05-01 VITALS
DIASTOLIC BLOOD PRESSURE: 71 MMHG | SYSTOLIC BLOOD PRESSURE: 131 MMHG | WEIGHT: 100.09 LBS | HEART RATE: 66 BPM | HEIGHT: 65 IN | BODY MASS INDEX: 16.68 KG/M2

## 2018-05-01 DIAGNOSIS — M51.36 DDD (DEGENERATIVE DISC DISEASE), LUMBAR: Primary | ICD-10-CM

## 2018-05-01 DIAGNOSIS — M54.16 LUMBAR RADICULITIS: ICD-10-CM

## 2018-05-01 DIAGNOSIS — M48.061 LUMBAR FORAMINAL STENOSIS: ICD-10-CM

## 2018-05-01 PROCEDURE — G8427 DOCREV CUR MEDS BY ELIG CLIN: HCPCS | Performed by: PHYSICIAN ASSISTANT

## 2018-05-01 PROCEDURE — 99214 OFFICE O/P EST MOD 30 MIN: CPT | Performed by: PHYSICIAN ASSISTANT

## 2018-05-01 PROCEDURE — 1123F ACP DISCUSS/DSCN MKR DOCD: CPT | Performed by: PHYSICIAN ASSISTANT

## 2018-05-01 PROCEDURE — G8598 ASA/ANTIPLAT THER USED: HCPCS | Performed by: PHYSICIAN ASSISTANT

## 2018-05-01 PROCEDURE — G8418 CALC BMI BLW LOW PARAM F/U: HCPCS | Performed by: PHYSICIAN ASSISTANT

## 2018-05-01 PROCEDURE — 1036F TOBACCO NON-USER: CPT | Performed by: PHYSICIAN ASSISTANT

## 2018-05-01 PROCEDURE — 4040F PNEUMOC VAC/ADMIN/RCVD: CPT | Performed by: PHYSICIAN ASSISTANT

## 2018-05-15 ENCOUNTER — PRE-EVALUATION (OUTPATIENT)
Dept: ORTHOPEDIC SURGERY | Age: 83
End: 2018-05-15

## 2018-05-15 ENCOUNTER — OFFICE VISIT (OUTPATIENT)
Dept: ORTHOPEDIC SURGERY | Age: 83
End: 2018-05-15

## 2018-05-15 VITALS
SYSTOLIC BLOOD PRESSURE: 114 MMHG | DIASTOLIC BLOOD PRESSURE: 77 MMHG | BODY MASS INDEX: 16.68 KG/M2 | WEIGHT: 100.09 LBS | HEART RATE: 88 BPM | HEIGHT: 65 IN

## 2018-05-15 DIAGNOSIS — M51.36 DDD (DEGENERATIVE DISC DISEASE), LUMBAR: ICD-10-CM

## 2018-05-15 DIAGNOSIS — M43.16 SPONDYLOLISTHESIS OF LUMBAR REGION: Primary | ICD-10-CM

## 2018-05-15 PROCEDURE — G8427 DOCREV CUR MEDS BY ELIG CLIN: HCPCS | Performed by: ORTHOPAEDIC SURGERY

## 2018-05-15 PROCEDURE — G8418 CALC BMI BLW LOW PARAM F/U: HCPCS | Performed by: ORTHOPAEDIC SURGERY

## 2018-05-15 PROCEDURE — APPNB30 APP NON BILLABLE TIME 0-30 MINS: Performed by: PHYSICIAN ASSISTANT

## 2018-05-15 PROCEDURE — 99214 OFFICE O/P EST MOD 30 MIN: CPT | Performed by: ORTHOPAEDIC SURGERY

## 2018-05-23 ENCOUNTER — CARE COORDINATION (OUTPATIENT)
Dept: CARE COORDINATION | Age: 83
End: 2018-05-23

## 2018-05-23 NOTE — LETTER
5/23/2018    [address] D[address]  [facility] [address]      Dear Tiom Tyler,    My name is Gustavo Berger and I am a registered nurse who partners with Belia Jiménez MD to improve patients' health. Belia Jiménez MD believes you would benefit from working with me. As a member of your health care team, I would work with other providers involved in your care, offer education for your specific health conditions, and connect you with additional resources as needed. I will collaborate with Belia Jiménez MD to support you in following your treatment plan. The additional support I provide is no additional cost to you. My primary focus is to help you achieve specific goals and improve your health. We are committed to walk with you on this journey and look forward to working with you. Please call me to further discuss your healthcare needs. I am available by phone or for appointments at the office. You can reach me at [phone].     In good health,     Gustavo Berger RN

## 2018-05-23 NOTE — CARE COORDINATION
Ambulatory Care Coordination Note  5/23/2018  CM Risk Score: 1  Alma Rosa Mortality Risk Score: 18.74    ACC: Glendy Leigh RN    Summary Note: Patient reports his weight is still around 100 lbs. He has an upcoming procedure planned to open up an artery to him stomach at [facility]. He states that this has been contributing to his weight loss. This is planned for Friday tentatively as an outpatient and return home. He denies need for home care or additional support from senior services. Son and wife to help him with care and transportation. He also has some issue with his back and shoulder that may need future surgical intervention. L5-S1 spondylolisthesis with severe right foraminal stenosis noted in chart. He was also referred to [facility] for spinal nerve stimulator.     Past Medical History:   Diagnosis Date    Arthritis     CAD (coronary artery disease) 1990    CABG    COPD (chronic obstructive pulmonary disease) (HCC)     Emphysema of lung (HCC)     Hyperlipidemia     Osteoarthritis     Pneumonia      Plan   Plan to increase call frequency due to upcoming surgery   Continued assessment for needs  Monitoring for improved weights (he is only around 100 lbs)      Care Coordination Interventions    Program Enrollment:  Rising Risk  Referral from Primary Care Provider:  No  Suggested Interventions and Community Resources  Fall Risk Prevention:  Completed  Home Health Services:  Declined  Medication Assistance Program:  Declined  Medi Set or Pill Pack:  Declined  Senior Services:  Declined  Social Work:  Declined  Zone Management Tools:  (Comment: copd zone mailed and discussed)         Goals Addressed             Most Recent       Care Coordination     Nutrition Plan   No change (5/23/2018)             I will monitor weight and increase protein to prevent muscle wasting    Barriers: impairment:  none  Plan for overcoming my barriers: N/A  Confidence: 8/10  Anticipated Goal Completion Date: 3/18         General     Care Coordination Self Management   No change (5/23/2018)             CC Self Management Goals   Patient Goal (What steps will patient take to achieve goal?): Alternate activity with rest periods prn  Patient is able to discuss self-management of condition(s):  Pt demonstrates adherence to medications  Pt demonstrates understanding of self-monitoring  Patient is able to identify Red Flags:  Alert to potential adverse drug reactions(s) or side effects and actions to take should they arise  Discuss target symptoms and actions to take should they arise  Identify problems that require immediate PCP or specialist visit  Patient demonstrates understanding of access to PCP/Specialist:  Understands about scheduling routine Follow Up appointments   Understands about sick day appointment options for worsening of symptoms/progression (Same Day, E Visits)            Prior to Admission medications    Medication Sig Start Date End Date Taking? Authorizing Provider   atorvastatin (LIPITOR) 80 MG tablet TAKE 1 TABLET BY MOUTH EVERY DAY. For high cholesterol 2/5/18   Fletcher Reason Jazmin, MD   Tiotropium Bromide-Olodaterol 2.5-2.5 MCG/ACT AERS Inhale 2 puffs into the lungs daily 1/17/18   Kimberly Basilio MD   aspirin 81 MG tablet Take 81 mg by mouth daily.     Historical Provider, MD       Future Appointments  Date Time Provider Thee Aguirre   1/16/2019 9:00 AM BURAK Childs - CNP [facility]      and   COPD Assessment    Does the patient understand envrionmental exposure?:  Yes  Is the patient able to verbalize Rescue vs. Long Acting medications?:  Yes  Does the patient have a nebulizer?:  Yes  Does the patient use a space with inhaled medications?:  No     No patient-reported symptoms         Symptoms:      Have you had a recent diagnosis of pneumonia either by PCP or at a hospital?:  No

## 2018-06-25 ENCOUNTER — OFFICE VISIT (OUTPATIENT)
Dept: ORTHOPEDIC SURGERY | Age: 83
End: 2018-06-25

## 2018-06-25 VITALS
HEART RATE: 62 BPM | WEIGHT: 100 LBS | HEIGHT: 65 IN | BODY MASS INDEX: 16.66 KG/M2 | DIASTOLIC BLOOD PRESSURE: 65 MMHG | SYSTOLIC BLOOD PRESSURE: 122 MMHG

## 2018-06-25 DIAGNOSIS — M75.122 COMPLETE TEAR OF LEFT ROTATOR CUFF: ICD-10-CM

## 2018-06-25 DIAGNOSIS — M25.512 LEFT SHOULDER PAIN, UNSPECIFIED CHRONICITY: Primary | ICD-10-CM

## 2018-06-25 PROCEDURE — 99214 OFFICE O/P EST MOD 30 MIN: CPT | Performed by: ORTHOPAEDIC SURGERY

## 2018-06-28 ENCOUNTER — TELEPHONE (OUTPATIENT)
Dept: ORTHOPEDIC SURGERY | Age: 83
End: 2018-06-28

## 2018-06-29 ENCOUNTER — HOSPITAL ENCOUNTER (OUTPATIENT)
Dept: MRI IMAGING | Age: 83
Discharge: HOME | End: 2018-06-29

## 2018-06-29 DIAGNOSIS — M75.122 COMPLETE TEAR OF LEFT ROTATOR CUFF: ICD-10-CM

## 2018-07-16 ENCOUNTER — OFFICE VISIT (OUTPATIENT)
Dept: ORTHOPEDIC SURGERY | Age: 83
End: 2018-07-16

## 2018-07-16 ENCOUNTER — TELEPHONE (OUTPATIENT)
Dept: ORTHOPEDIC SURGERY | Age: 83
End: 2018-07-16

## 2018-07-16 VITALS — WEIGHT: 100 LBS | HEIGHT: 65 IN | BODY MASS INDEX: 16.66 KG/M2

## 2018-07-16 DIAGNOSIS — M75.102 TEAR OF LEFT ROTATOR CUFF, UNSPECIFIED TEAR EXTENT: Primary | ICD-10-CM

## 2018-07-16 PROCEDURE — L3670 SO ACRO/CLAV CAN WEB PRE OTS: HCPCS | Performed by: ORTHOPAEDIC SURGERY

## 2018-07-16 PROCEDURE — 99213 OFFICE O/P EST LOW 20 MIN: CPT | Performed by: ORTHOPAEDIC SURGERY

## 2018-07-16 NOTE — PROGRESS NOTES
CHIEF COMPLAINT:    Chief Complaint   Patient presents with    Results     TR MRI LEFT SHOULDER       HISTORY OF PRESENT ILLNESS:                The patient is a 80 y.o. male returns to clinic today to review his MRI results of the left shoulder. This is a gentleman who fell pop when lifting something at the grocery store. He has a history of rotator cuff repair to the contralateral shoulder many years ago. He is very active despite his age. He reports that prior to this popping of that he really had no symptoms involving his LEFT shoulder.      he does have a surgical cardiac history     Past Medical History:   Diagnosis Date    Arthritis     CAD (coronary artery disease) 1990    CABG    [facility]     [facility]ma [facility] lung ([facility])     Hyperlipidemia     Osteoarthritis     Pneumonia           The pain assessment was noted & is as follows:  Pain Assessment  Location of Pain: Shoulder  Location Modifiers: Left  Severity of Pain: 10  Quality of Pain: Aching  Duration of Pain: Persistent  Frequency of Pain: Constant  Aggravating Factors: Bending, Stretching, Straightening, Exercise]      Work Status/Functionality:     Past Medical History: Medical history form was reviewed today & can be found in the media tab  Past Medical History:   Diagnosis Date    Arthritis     CAD (coronary artery disease) 1990    CABG    [facility]) [facility])     [facility] ([facility] [address]     Hyperlipidemia     Osteoarthritis     Pneumonia       Past Surgical History:     Past Surgical History:   Procedure Laterality Date    CARDIAC SURGERY      stent    COLONOSCOPY      COLONOSCOPY  12 IOct 2015    Diverticulosis    CORONARY ARTERY BYPASS GRAFT  1990    DIAGNOSTIC CARDIAC CATH LAB PROCEDURE      ENDOSCOPY, COLON, DIAGNOSTIC      ENDOSCOPY, COLON, DIAGNOSTIC  12 Oct 2015    biopsy    EYE SURGERY Bilateral     Cataract    KNEE ARTHROSCOPY Bilateral     PRE-MALIGNANT / BENIGN SKIN LESION EXCISION Right     arm    SPINE SURGERY  2006    Lumbar Disc    UPPER GASTROINTESTINAL ENDOSCOPY  10/12/2015    Hiatal Hernia. Small Bowel Biopsy Negative     Current Medications:     Current Outpatient Prescriptions:     atorvastatin (LIPITOR) 80 MG tablet, TAKE 1 TABLET BY MOUTH EVERY DAY. For high cholesterol, Disp: 90 tablet, Rfl: 3    Tiotropium Bromide-Olodaterol 2.5-2.5 MCG/ACT AERS, Inhale 2 puffs into the lungs daily, Disp: 1 Inhaler, Rfl: 11    aspirin 81 MG tablet, Take 81 mg by mouth daily. , Disp: , Rfl:   Allergies:  Patient has no known allergies. Social History:    reports that he quit smoking about 2 years ago. His smoking use included Cigarettes. He has a 60.00 pack-year smoking history. He has never used smokeless tobacco. He reports that he does not drink alcohol or use drugs. Family History:   Family History   Problem Relation Age of Onset    Arthritis Mother     Other Mother         PN    Heart Disease Father        REVIEW OF SYSTEMS:   For new problems, a full review of systems will be found scanned in the patient's chart. CONSTITUTIONAL: Denies unexplained weight loss, fevers, chills   NEUROLOGICAL: Denies unsteady gait or progressive weakness  SKIN: Denies skin changes, delayed healing, rash, itching       PHYSICAL EXAM:    Vitals: Height 5' 5\" (1.651 m), weight 100 lb (45.4 kg). GENERAL EXAM:  · General Apparence: Patient is adequately groomed with no evidence of malnutrition. · Orientation: The patient is oriented to time, place and person. · Mood & Affect:The patient's mood and affect are appropriate       Left shoulder PHYSICAL EXAMINATION:  · Inspection:  Visible deformity. No significant edema, erythema or ecchymosis. · Palpation:  Tenderness to palpation at the subacromial space      · Range of Motion: He is able to perform full range motion of her head however, with pain and some popping.     · Strength: Supraspinatus strength testing is diminished    · Special Tests:  Positive empty can testing. Pain with Lopez testing. · Skin:  There are no rashes, ulcerations or lesions. · Gait & station: Normal gait      · Additional Examinations:        Right Upper Extremity:  Examination of the right upper extremity does not show any tenderness, deformity or injury. Range of motion is unremarkable. There is no gross instability. There are no rashes, ulcerations or lesions. Strength and tone are normal.      Diagnostic Testing:      MRI:  1. High-grade bursal sided versus full-thickness tearing of the supraspinatus   tendon at the footplate measuring approximately 1.0 x 2.4 cm superimposed on   moderate tendinosis. 2. Moderate infraspinatus tendinosis with mild interstitial tearing and   bursal sided fraying. 3. Moderate glenohumeral mild-to-moderate acromioclavicular osteoarthritis. Orders   No orders of the defined types were placed in this encounter. Assessment / Treatment Plan:     1. Left shoulder rotator cuff tearHis supraspinatus. I discussed treatment options at the shoulder and actually have recommended conservative care with a cortisone shot and physical therapy in hopes to avoid surgery. The patient is much more interested in surgical intervention to repair this injury. I also recommended that we try an injection and therapy but again he is fairly vehement about wanting surgery as soon as possible. We discussed with him the risks associated with this and he will need cardiac clearance prior to surgery. We'll plan to perform left shoulder video arthroscopy with Neer acromioplasty, possible Khadra procedure, and mini open rotator cuff repair    We discussed shoulder arthroscopy surgery in detail. We talked about the arthroscopic nature of the procedure, the portals utilized and what can be done through these portals.   We also discussed concerns regarding surgery, specifically including

## 2018-08-03 ENCOUNTER — CARE COORDINATION (OUTPATIENT)
Dept: CARE COORDINATION | Age: 83
End: 2018-08-03

## 2018-08-10 ENCOUNTER — TELEPHONE (OUTPATIENT)
Dept: ORTHOPEDIC SURGERY | Age: 83
End: 2018-08-10

## 2018-08-24 ENCOUNTER — TELEPHONE (OUTPATIENT)
Dept: ORTHOPEDIC SURGERY | Age: 83
End: 2018-08-24

## 2018-08-24 NOTE — TELEPHONE ENCOUNTER
[id number]  AUTH-NPR  [id number]  [id number]  DENIED  DENIED FOR NOT MEDICALLY NECESSARY  DENIAL SCANNED INTO MEDIA  [facility]

## 2018-08-28 ENCOUNTER — CARE COORDINATION (OUTPATIENT)
Dept: CARE COORDINATION | Age: 83
End: 2018-08-28

## 2018-08-28 ENCOUNTER — HOSPITAL ENCOUNTER (OUTPATIENT)
Age: 83
Discharge: HOME | End: 2018-08-28
Payer: MEDICARE

## 2018-08-28 ENCOUNTER — OFFICE VISIT (OUTPATIENT)
Dept: INTERNAL MEDICINE CLINIC | Age: 83
End: 2018-08-28

## 2018-08-28 VITALS
SYSTOLIC BLOOD PRESSURE: 120 MMHG | DIASTOLIC BLOOD PRESSURE: 68 MMHG | HEART RATE: 83 BPM | OXYGEN SATURATION: 93 % | WEIGHT: 97 LBS | BODY MASS INDEX: 16.14 KG/M2

## 2018-08-28 DIAGNOSIS — Z01.818 PREOP EXAMINATION: ICD-10-CM

## 2018-08-28 DIAGNOSIS — Z01.812 ENCOUNTER FOR PRE-OPERATIVE LABORATORY TESTING: ICD-10-CM

## 2018-08-28 DIAGNOSIS — M75.102 TEAR OF LEFT ROTATOR CUFF, UNSPECIFIED TEAR EXTENT: Primary | ICD-10-CM

## 2018-08-28 LAB
A/G RATIO: 1.3 (ref 1.1–2.2)
ALBUMIN SERPL-MCNC: 4.3 G/DL (ref 3.4–5)
ALP BLD-CCNC: 64 U/L (ref 40–129)
ALT SERPL-CCNC: 9 U/L (ref 10–40)
ANION GAP SERPL CALCULATED.3IONS-SCNC: 11 MMOL/L (ref 3–16)
AST SERPL-CCNC: 16 U/L (ref 15–37)
BILIRUB SERPL-MCNC: <0.2 MG/DL (ref 0–1)
BUN BLDV-MCNC: 14 MG/DL (ref 7–20)
CALCIUM SERPL-MCNC: 9.7 MG/DL (ref 8.3–10.6)
CHLORIDE BLD-SCNC: 100 MMOL/L (ref 99–110)
CO2: 29 MMOL/L (ref 21–32)
CREAT SERPL-MCNC: 0.8 MG/DL (ref 0.8–1.3)
GFR AFRICAN AMERICAN: >60
GFR NON-AFRICAN AMERICAN: >60
GLOBULIN: 3.3 G/DL
GLUCOSE BLD-MCNC: 113 MG/DL (ref 70–99)
POTASSIUM SERPL-SCNC: 4.3 MMOL/L (ref 3.5–5.1)
SODIUM BLD-SCNC: 140 MMOL/L (ref 136–145)
TOTAL PROTEIN: 7.6 G/DL (ref 6.4–8.2)

## 2018-08-28 PROCEDURE — 36415 COLL VENOUS BLD VENIPUNCTURE: CPT

## 2018-08-28 PROCEDURE — 99214 OFFICE O/P EST MOD 30 MIN: CPT | Performed by: NURSE PRACTITIONER

## 2018-08-28 PROCEDURE — 80053 COMPREHEN METABOLIC PANEL: CPT

## 2018-08-28 RX ORDER — OMEPRAZOLE 20 MG/1
20 CAPSULE, DELAYED RELEASE ORAL DAILY
COMMUNITY
End: 2019-07-02

## 2018-08-28 NOTE — CARE COORDINATION
ACC attempted outreach with patient . He was currently at an appt for preop for his shoulder. Spoke with his son briefly. Plan to try for outreach call again tomorrow afternoon.

## 2018-08-28 NOTE — PROGRESS NOTES
Date    Arthritis     CAD (coronary artery disease) 1990    CABG    COPD (chronic obstructive pulmonary disease) (HCC)     Emphysema of lung ([facility] [address]     Hyperlipidemia     Osteoarthritis     Pneumonia      Past Surgical History:   Procedure Laterality Date    CARDIAC SURGERY      stent    COLONOSCOPY      COLONOSCOPY  12 IOct 2015    Diverticulosis    CORONARY ARTERY BYPASS GRAFT  1990    DIAGNOSTIC CARDIAC CATH LAB PROCEDURE      ENDOSCOPY, COLON, DIAGNOSTIC      ENDOSCOPY, COLON, DIAGNOSTIC  12 Oct 2015    biopsy    EYE SURGERY Bilateral     Cataract    KNEE ARTHROSCOPY Bilateral     PRE-MALIGNANT / BENIGN SKIN LESION EXCISION Right     arm    SPINE SURGERY  2006    Lumbar Disc    UPPER GASTROINTESTINAL ENDOSCOPY  10/12/2015    Hiatal Hernia. Small Bowel Biopsy Negative     Family History   Problem Relation Age of Onset    Arthritis Mother     Other Mother         PN    Heart Disease Father      Social History     Social History    Marital status:      Spouse name: N/A    Number of children: N/A    Years of education: N/A     Occupational History    Not on file. Social History Main Topics    Smoking status: Former Smoker     Packs/day: 1.00     Years: 60.00     Types: Cigarettes     Quit date: 1/22/2016    Smokeless tobacco: Never Used    Alcohol use No    Drug use: No    Sexual activity: Yes     Partners: Female     Other Topics Concern    Not on file     Social History Narrative    No narrative on file       Review of Systems  All other systems were reviewed and are negative. Physical Exam   Constitutional: He is oriented to person, place, and time. He appears well-developed and well-nourished. No distress. HENT:   Head: Normocephalic and atraumatic. Mouth/Throat: Uvula is midline, oropharynx is clear and moist and mucous membranes are normal.   Eyes: Conjunctivae and EOM are normal. Pupils are equal, round, and reactive to light.    Neck: Trachea normal and normal range of motion. Neck supple. No JVD present. Carotid bruit is not present. No mass and no thyromegaly present. Cardiovascular: Normal rate, regular rhythm, normal heart sounds and intact distal pulses. Exam reveals no gallop and no friction rub. No murmur heard. Pulmonary/Chest: Effort normal and breath sounds normal. No respiratory distress. He has no wheezes. He has no rales. Abdominal: Soft. Normal aorta and bowel sounds are normal. He exhibits no distension and no mass. There is no hepatosplenomegaly. No tenderness. Musculoskeletal: He exhibits no edema and no tenderness. Neurological: He is alert and oriented to person, place, and time. He has normal strength. No cranial nerve deficit or sensory deficit. Coordination and gait normal.   Skin: Skin is warm and dry. No rash noted. No erythema. Psychiatric: He has a normal mood and affect. His behavior is normal.     EKG Interpretation:  See Cardiac Clearance from Mark Hanson NP [facility]. .    Lab Review   No visits with results within 2 Month(s) from this visit.    Latest known visit with results is:   Hospital Outpatient Visit on 08/04/2017   Component Date Value    Cholesterol, Total 08/04/2017 185     Triglycerides 08/04/2017 96     HDL 08/04/2017 39*    LDL Calculated 08/04/2017 127*    VLDL Cholesterol Calcula* 08/04/2017 19     Sodium 08/04/2017 140     Potassium 08/04/2017 4.2     Chloride 08/04/2017 101     CO2 08/04/2017 27     Anion Gap 08/04/2017 12     Glucose 08/04/2017 96     BUN 08/04/2017 17     CREATININE 08/04/2017 0.7*    GFR Non- 08/04/2017 >60     GFR  08/04/2017 >60     Calcium 08/04/2017 9.1     Total Protein 08/04/2017 7.3     Alb 08/04/2017 4.1     Albumin/Globulin Ratio 08/04/2017 1.3     Total Bilirubin 08/04/2017 0.4     Alkaline Phosphatase 08/04/2017 59     ALT 08/04/2017 10     AST 08/04/2017 15     Globulin 08/04/2017 3.2

## 2018-08-28 NOTE — PROGRESS NOTES
Obstructive Sleep Apnea (BASSAM) Screening     Patient:  Molly French    YOB: 1932      Medical Record #:  [id number]                     Date:  8/28/2018     1. Are you a loud and/or regular snorer? []  Yes       [x] No    2. Have you been observed to gasp or stop breathing during sleep? []  Yes       [x] No    3. Do you feel tired or groggy upon awakening or do you awaken with a headache?           []  Yes       [] No    4. Are you often tired or fatigued during the wake time hours? []  Yes       [] No    5. Do you fall asleep sitting, reading, watching TV or driving? []  Yes       [] No    6. Do you often have problems with memory or concentration? []  Yes       [] No    **If patient's score is ? 3 they are considered high risk for BASSAM. Notify the anesthesiologist of the high risk and document in focus note. Note:  If the patient's BMI is more than 35 kg m¯² , has neck circumference > 40 cm, and/or high blood pressure the risk is greater (© American Sleep [employer]nea Association, 2006).

## 2018-09-03 ENCOUNTER — ANESTHESIA EVENT (OUTPATIENT)
Dept: OPERATING ROOM | Age: 83
End: 2018-09-03
Payer: MEDICARE

## 2018-09-04 ENCOUNTER — ANESTHESIA (OUTPATIENT)
Dept: OPERATING ROOM | Age: 83
End: 2018-09-04
Payer: MEDICARE

## 2018-09-04 ENCOUNTER — HOSPITAL ENCOUNTER (OUTPATIENT)
Age: 83
Setting detail: OUTPATIENT SURGERY
Discharge: HOME | End: 2018-09-04
Attending: ORTHOPAEDIC SURGERY | Admitting: ORTHOPAEDIC SURGERY
Payer: MEDICARE

## 2018-09-04 VITALS
HEIGHT: 65 IN | DIASTOLIC BLOOD PRESSURE: 53 MMHG | OXYGEN SATURATION: 93 % | SYSTOLIC BLOOD PRESSURE: 144 MMHG | RESPIRATION RATE: 27 BRPM | BODY MASS INDEX: 16.16 KG/M2 | WEIGHT: 97 LBS | TEMPERATURE: 97.7 F | HEART RATE: 91 BPM

## 2018-09-04 VITALS
DIASTOLIC BLOOD PRESSURE: 60 MMHG | RESPIRATION RATE: 6 BRPM | OXYGEN SATURATION: 99 % | SYSTOLIC BLOOD PRESSURE: 115 MMHG

## 2018-09-04 DIAGNOSIS — Z98.890 S/P ARTHROSCOPY OF SHOULDER: Primary | ICD-10-CM

## 2018-09-04 PROCEDURE — 2500000003 HC RX 250 WO HCPCS: Performed by: NURSE ANESTHETIST, CERTIFIED REGISTERED

## 2018-09-04 PROCEDURE — 3700000000 HC ANESTHESIA ATTENDED CARE: Performed by: ORTHOPAEDIC SURGERY

## 2018-09-04 PROCEDURE — 6360000002 HC RX W HCPCS: Performed by: ORTHOPAEDIC SURGERY

## 2018-09-04 PROCEDURE — 6360000002 HC RX W HCPCS: Performed by: ANESTHESIOLOGY

## 2018-09-04 PROCEDURE — C9290 INJ, BUPIVACAINE LIPOSOME: HCPCS | Performed by: ORTHOPAEDIC SURGERY

## 2018-09-04 PROCEDURE — 6370000000 HC RX 637 (ALT 250 FOR IP)

## 2018-09-04 PROCEDURE — 2500000003 HC RX 250 WO HCPCS: Performed by: ORTHOPAEDIC SURGERY

## 2018-09-04 PROCEDURE — 93005 ELECTROCARDIOGRAM TRACING: CPT | Performed by: ANESTHESIOLOGY

## 2018-09-04 PROCEDURE — 6370000000 HC RX 637 (ALT 250 FOR IP): Performed by: ANESTHESIOLOGY

## 2018-09-04 PROCEDURE — 2580000003 HC RX 258: Performed by: ORTHOPAEDIC SURGERY

## 2018-09-04 PROCEDURE — 3600000014 HC SURGERY LEVEL 4 ADDTL 15MIN: Performed by: ORTHOPAEDIC SURGERY

## 2018-09-04 PROCEDURE — 7100000001 HC PACU RECOVERY - ADDTL 15 MIN: Performed by: ORTHOPAEDIC SURGERY

## 2018-09-04 PROCEDURE — 3700000001 HC ADD 15 MINUTES (ANESTHESIA): Performed by: ORTHOPAEDIC SURGERY

## 2018-09-04 PROCEDURE — 2580000003 HC RX 258: Performed by: ANESTHESIOLOGY

## 2018-09-04 PROCEDURE — 2720000010 HC SURG SUPPLY STERILE: Performed by: ORTHOPAEDIC SURGERY

## 2018-09-04 PROCEDURE — 6360000002 HC RX W HCPCS: Performed by: NURSE ANESTHETIST, CERTIFIED REGISTERED

## 2018-09-04 PROCEDURE — 7100000011 HC PHASE II RECOVERY - ADDTL 15 MIN: Performed by: ORTHOPAEDIC SURGERY

## 2018-09-04 PROCEDURE — 2709999900 HC NON-CHARGEABLE SUPPLY: Performed by: ORTHOPAEDIC SURGERY

## 2018-09-04 PROCEDURE — C1713 ANCHOR/SCREW BN/BN,TIS/BN: HCPCS | Performed by: ORTHOPAEDIC SURGERY

## 2018-09-04 PROCEDURE — 93010 ELECTROCARDIOGRAM REPORT: CPT | Performed by: INTERNAL MEDICINE

## 2018-09-04 PROCEDURE — 3600000004 HC SURGERY LEVEL 4 BASE: Performed by: ORTHOPAEDIC SURGERY

## 2018-09-04 PROCEDURE — 7100000010 HC PHASE II RECOVERY - FIRST 15 MIN: Performed by: ORTHOPAEDIC SURGERY

## 2018-09-04 PROCEDURE — 7100000000 HC PACU RECOVERY - FIRST 15 MIN: Performed by: ORTHOPAEDIC SURGERY

## 2018-09-04 DEVICE — HEALICOIL RG SA 5.5MM W/2 UB-BL                                    CBRD BL
Type: IMPLANTABLE DEVICE | Status: FUNCTIONAL
Brand: HEALICOIL / ULTRABRAID

## 2018-09-04 DEVICE — MULTIFIX S-ULTRA 5.5MM KNOTLESS ANCHOR
Type: IMPLANTABLE DEVICE | Status: FUNCTIONAL
Brand: MULTIFIX

## 2018-09-04 RX ORDER — ROCURONIUM BROMIDE 10 MG/ML
INJECTION, SOLUTION INTRAVENOUS PRN
Status: DISCONTINUED | OUTPATIENT
Start: 2018-09-04 | End: 2018-09-04 | Stop reason: SDUPTHER

## 2018-09-04 RX ORDER — OXYCODONE HYDROCHLORIDE AND ACETAMINOPHEN 5; 325 MG/1; MG/1
1 TABLET ORAL PRN
Status: COMPLETED | OUTPATIENT
Start: 2018-09-04 | End: 2018-09-04

## 2018-09-04 RX ORDER — FENTANYL CITRATE 50 UG/ML
INJECTION, SOLUTION INTRAMUSCULAR; INTRAVENOUS PRN
Status: DISCONTINUED | OUTPATIENT
Start: 2018-09-04 | End: 2018-09-04 | Stop reason: SDUPTHER

## 2018-09-04 RX ORDER — HYDRALAZINE HYDROCHLORIDE 20 MG/ML
5 INJECTION INTRAMUSCULAR; INTRAVENOUS
Status: DISCONTINUED | OUTPATIENT
Start: 2018-09-04 | End: 2018-09-04 | Stop reason: HOSPADM

## 2018-09-04 RX ORDER — IPRATROPIUM BROMIDE AND ALBUTEROL SULFATE 2.5; .5 MG/3ML; MG/3ML
SOLUTION RESPIRATORY (INHALATION)
Status: COMPLETED
Start: 2018-09-04 | End: 2018-09-04

## 2018-09-04 RX ORDER — SODIUM CHLORIDE, SODIUM LACTATE, POTASSIUM CHLORIDE, CALCIUM CHLORIDE 600; 310; 30; 20 MG/100ML; MG/100ML; MG/100ML; MG/100ML
INJECTION, SOLUTION INTRAVENOUS CONTINUOUS
Status: DISCONTINUED | OUTPATIENT
Start: 2018-09-04 | End: 2018-09-04 | Stop reason: HOSPADM

## 2018-09-04 RX ORDER — OXYCODONE HYDROCHLORIDE AND ACETAMINOPHEN 5; 325 MG/1; MG/1
2 TABLET ORAL PRN
Status: COMPLETED | OUTPATIENT
Start: 2018-09-04 | End: 2018-09-04

## 2018-09-04 RX ORDER — LIDOCAINE HYDROCHLORIDE 20 MG/ML
INJECTION, SOLUTION INFILTRATION; PERINEURAL PRN
Status: DISCONTINUED | OUTPATIENT
Start: 2018-09-04 | End: 2018-09-04 | Stop reason: SDUPTHER

## 2018-09-04 RX ORDER — DEXAMETHASONE SODIUM PHOSPHATE 4 MG/ML
INJECTION, SOLUTION INTRA-ARTICULAR; INTRALESIONAL; INTRAMUSCULAR; INTRAVENOUS; SOFT TISSUE PRN
Status: DISCONTINUED | OUTPATIENT
Start: 2018-09-04 | End: 2018-09-04 | Stop reason: SDUPTHER

## 2018-09-04 RX ORDER — SODIUM CHLORIDE, SODIUM LACTATE, POTASSIUM CHLORIDE, AND CALCIUM CHLORIDE .6; .31; .03; .02 G/100ML; G/100ML; G/100ML; G/100ML
IRRIGANT IRRIGATION PRN
Status: DISCONTINUED | OUTPATIENT
Start: 2018-09-04 | End: 2018-09-04 | Stop reason: HOSPADM

## 2018-09-04 RX ORDER — OXYCODONE HYDROCHLORIDE AND ACETAMINOPHEN 5; 325 MG/1; MG/1
1 TABLET ORAL EVERY 4 HOURS PRN
Qty: 30 TABLET | Refills: 0 | Status: SHIPPED | OUTPATIENT
Start: 2018-09-04 | End: 2018-09-09

## 2018-09-04 RX ORDER — PROPOFOL 10 MG/ML
INJECTION, EMULSION INTRAVENOUS PRN
Status: DISCONTINUED | OUTPATIENT
Start: 2018-09-04 | End: 2018-09-04 | Stop reason: SDUPTHER

## 2018-09-04 RX ORDER — MINERAL OIL AND PETROLATUM 150; 830 MG/G; MG/G
OINTMENT OPHTHALMIC PRN
Status: DISCONTINUED | OUTPATIENT
Start: 2018-09-04 | End: 2018-09-04 | Stop reason: SDUPTHER

## 2018-09-04 RX ORDER — MORPHINE SULFATE 2 MG/ML
2 INJECTION, SOLUTION INTRAMUSCULAR; INTRAVENOUS EVERY 5 MIN PRN
Status: DISCONTINUED | OUTPATIENT
Start: 2018-09-04 | End: 2018-09-04 | Stop reason: HOSPADM

## 2018-09-04 RX ORDER — MORPHINE SULFATE 2 MG/ML
1 INJECTION, SOLUTION INTRAMUSCULAR; INTRAVENOUS EVERY 5 MIN PRN
Status: DISCONTINUED | OUTPATIENT
Start: 2018-09-04 | End: 2018-09-04 | Stop reason: HOSPADM

## 2018-09-04 RX ORDER — MEPERIDINE HYDROCHLORIDE 50 MG/ML
12.5 INJECTION INTRAMUSCULAR; INTRAVENOUS; SUBCUTANEOUS EVERY 5 MIN PRN
Status: DISCONTINUED | OUTPATIENT
Start: 2018-09-04 | End: 2018-09-04 | Stop reason: HOSPADM

## 2018-09-04 RX ORDER — MAGNESIUM HYDROXIDE 1200 MG/15ML
LIQUID ORAL CONTINUOUS PRN
Status: DISCONTINUED | OUTPATIENT
Start: 2018-09-04 | End: 2018-09-04 | Stop reason: HOSPADM

## 2018-09-04 RX ORDER — IPRATROPIUM BROMIDE AND ALBUTEROL SULFATE 2.5; .5 MG/3ML; MG/3ML
1 SOLUTION RESPIRATORY (INHALATION) ONCE
Status: COMPLETED | OUTPATIENT
Start: 2018-09-04 | End: 2018-09-04

## 2018-09-04 RX ORDER — LABETALOL HYDROCHLORIDE 5 MG/ML
5 INJECTION, SOLUTION INTRAVENOUS EVERY 10 MIN PRN
Status: DISCONTINUED | OUTPATIENT
Start: 2018-09-04 | End: 2018-09-04 | Stop reason: HOSPADM

## 2018-09-04 RX ORDER — DIPHENHYDRAMINE HYDROCHLORIDE 50 MG/ML
12.5 INJECTION INTRAMUSCULAR; INTRAVENOUS
Status: DISCONTINUED | OUTPATIENT
Start: 2018-09-04 | End: 2018-09-04 | Stop reason: HOSPADM

## 2018-09-04 RX ORDER — ONDANSETRON 2 MG/ML
4 INJECTION INTRAMUSCULAR; INTRAVENOUS
Status: COMPLETED | OUTPATIENT
Start: 2018-09-04 | End: 2018-09-04

## 2018-09-04 RX ADMIN — SODIUM CHLORIDE, POTASSIUM CHLORIDE, SODIUM LACTATE AND CALCIUM CHLORIDE: 600; 310; 30; 20 INJECTION, SOLUTION INTRAVENOUS at 07:13

## 2018-09-04 RX ADMIN — ONDANSETRON 4 MG: 2 INJECTION INTRAMUSCULAR; INTRAVENOUS at 08:30

## 2018-09-04 RX ADMIN — HYDROMORPHONE HYDROCHLORIDE 0.25 MG: 1 INJECTION, SOLUTION INTRAMUSCULAR; INTRAVENOUS; SUBCUTANEOUS at 10:02

## 2018-09-04 RX ADMIN — IPRATROPIUM BROMIDE AND ALBUTEROL SULFATE 1 AMPULE: 2.5; .5 SOLUTION RESPIRATORY (INHALATION) at 07:47

## 2018-09-04 RX ADMIN — OXYCODONE HYDROCHLORIDE AND ACETAMINOPHEN 1 TABLET: 5; 325 TABLET ORAL at 10:20

## 2018-09-04 RX ADMIN — LIDOCAINE HYDROCHLORIDE 40 MG: 20 INJECTION, SOLUTION INFILTRATION; PERINEURAL at 08:24

## 2018-09-04 RX ADMIN — Medication 2 G: at 08:19

## 2018-09-04 RX ADMIN — DEXAMETHASONE SODIUM PHOSPHATE 4 MG: 4 INJECTION, SOLUTION INTRAMUSCULAR; INTRAVENOUS at 08:30

## 2018-09-04 RX ADMIN — PROPOFOL 100 MG: 10 INJECTION, EMULSION INTRAVENOUS at 08:24

## 2018-09-04 RX ADMIN — FENTANYL CITRATE 50 MCG: 50 INJECTION INTRAMUSCULAR; INTRAVENOUS at 08:47

## 2018-09-04 RX ADMIN — HYDROMORPHONE HYDROCHLORIDE 0.25 MG: 1 INJECTION, SOLUTION INTRAMUSCULAR; INTRAVENOUS; SUBCUTANEOUS at 09:50

## 2018-09-04 RX ADMIN — IPRATROPIUM BROMIDE AND ALBUTEROL SULFATE 1 AMPULE: .5; 3 SOLUTION RESPIRATORY (INHALATION) at 07:47

## 2018-09-04 RX ADMIN — MINERAL OIL AND PETROLATUM 1 EACH: 150; 830 OINTMENT OPHTHALMIC at 08:24

## 2018-09-04 RX ADMIN — ROCURONIUM BROMIDE 30 MG: 10 SOLUTION INTRAVENOUS at 08:24

## 2018-09-04 RX ADMIN — SUGAMMADEX 200 MG: 100 INJECTION, SOLUTION INTRAVENOUS at 09:24

## 2018-09-04 RX ADMIN — FENTANYL CITRATE 50 MCG: 50 INJECTION INTRAMUSCULAR; INTRAVENOUS at 08:24

## 2018-09-04 ASSESSMENT — PULMONARY FUNCTION TESTS
PIF_VALUE: 21
PIF_VALUE: 21
PIF_VALUE: 0
PIF_VALUE: 17
PIF_VALUE: 20
PIF_VALUE: 23
PIF_VALUE: 21
PIF_VALUE: 20
PIF_VALUE: 21
PIF_VALUE: 18
PIF_VALUE: 5
PIF_VALUE: 16
PIF_VALUE: 19
PIF_VALUE: 22
PIF_VALUE: 18
PIF_VALUE: 1
PIF_VALUE: 21
PIF_VALUE: 21
PIF_VALUE: 18
PIF_VALUE: 0
PIF_VALUE: 20
PIF_VALUE: 21
PIF_VALUE: 20
PIF_VALUE: 19
PIF_VALUE: 21
PIF_VALUE: 21
PIF_VALUE: 20
PIF_VALUE: 27
PIF_VALUE: 20
PIF_VALUE: 19
PIF_VALUE: 21
PIF_VALUE: 18
PIF_VALUE: 15
PIF_VALUE: 21
PIF_VALUE: 15
PIF_VALUE: 1
PIF_VALUE: 3
PIF_VALUE: 20
PIF_VALUE: 0
PIF_VALUE: 21
PIF_VALUE: 21
PIF_VALUE: 20
PIF_VALUE: 21
PIF_VALUE: 21
PIF_VALUE: 20
PIF_VALUE: 3
PIF_VALUE: 15
PIF_VALUE: 18
PIF_VALUE: 3
PIF_VALUE: 20
PIF_VALUE: 20
PIF_VALUE: 13
PIF_VALUE: 21
PIF_VALUE: 20
PIF_VALUE: 21
PIF_VALUE: 20
PIF_VALUE: 21
PIF_VALUE: 21
PIF_VALUE: 1
PIF_VALUE: 21
PIF_VALUE: 20
PIF_VALUE: 17
PIF_VALUE: 20
PIF_VALUE: 5
PIF_VALUE: 22
PIF_VALUE: 7
PIF_VALUE: 14
PIF_VALUE: 20
PIF_VALUE: 18
PIF_VALUE: 1
PIF_VALUE: 20
PIF_VALUE: 22
PIF_VALUE: 1
PIF_VALUE: 21
PIF_VALUE: 7

## 2018-09-04 ASSESSMENT — PAIN SCALES - GENERAL
PAINLEVEL_OUTOF10: 6

## 2018-09-04 ASSESSMENT — PAIN - FUNCTIONAL ASSESSMENT: PAIN_FUNCTIONAL_ASSESSMENT: 0-10

## 2018-09-04 NOTE — ANESTHESIA PRE PROCEDURE
Department of Anesthesiology  Preprocedure Note       Name:  Caroline Sanches   Age:  80 y.o.  :  1932                                          MRN:  [id number]         Date:  9/3/2018      Surgeon: Flaco Turcios):  Alea King MD    Procedure: Procedure(s):  EXAM UNDER ANESTHESIA, VIDEO ARTHROSCOPY LEFT SHOULDER, MINI OPEN ROTATOR CUFF REPAIR, NEER ACROMIOPLASTY, POSSIBLE CHRISTIANO PROCEDURE    Medications prior to admission:   Prior to Admission medications    Medication Sig Start Date End Date Taking? Authorizing Provider   omeprazole (PRILOSEC) 20 MG delayed release capsule Take 20 mg by mouth daily   Yes Historical Provider, MD   atorvastatin (LIPITOR) 80 MG tablet TAKE 1 TABLET BY MOUTH EVERY DAY. For high cholesterol 18   Dulce Wu MD   Tiotropium Bromide-Olodaterol 2.5-2.5 MCG/ACT AERS Inhale 2 puffs into the lungs daily 18   Carlos Dawson MD   aspirin 81 MG tablet Take 81 mg by mouth daily. Historical Provider, MD       Current medications:    No current facility-administered medications for this encounter. Current Outpatient Prescriptions   Medication Sig Dispense Refill    omeprazole (PRILOSEC) 20 MG delayed release capsule Take 20 mg by mouth daily      atorvastatin (LIPITOR) 80 MG tablet TAKE 1 TABLET BY MOUTH EVERY DAY. For high cholesterol 90 tablet 3    Tiotropium Bromide-Olodaterol 2.5-2.5 MCG/ACT AERS Inhale 2 puffs into the lungs daily 1 Inhaler 11    aspirin 81 MG tablet Take 81 mg by mouth daily.          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 M54.41, G89.29    Thyroid nodule E04.1    Constipation K59.00       Past Medical History:        Diagnosis Date    Arthritis     CAD (coronary artery disease) 1990    CABG    Cancer ([address]     skin    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     COPD (chronic obstructive pulmonary disease) (HCC)     Emphysema of lung (HCC)     Hyperlipidemia     Osteoarthritis     Pneumonia        Past Surgical History:        Procedure Laterality Date    CARDIAC SURGERY      stent    COLONOSCOPY      COLONOSCOPY  12 IOct 2015    Diverticulosis    CORONARY ARTERY BYPASS GRAFT  1990    DIAGNOSTIC CARDIAC CATH LAB PROCEDURE      ENDOSCOPY, COLON, DIAGNOSTIC      ENDOSCOPY, COLON, DIAGNOSTIC  12 Oct 2015    biopsy    EYE SURGERY Bilateral     Cataract    HERNIA REPAIR      KNEE ARTHROSCOPY Bilateral     OTHER SURGICAL HISTORY      patient states he had \"a stent put in from his heart to his digestive system\"    PRE-MALIGNANT / BENIGN SKIN LESION EXCISION Right     arm    SHOULDER ARTHROSCOPY Right     SPINE SURGERY  2006    Lumbar Disc    UPPER GASTROINTESTINAL ENDOSCOPY  10/12/2015    Hiatal Hernia.   Small Bowel Biopsy Negative       Social History:    Social History   Substance Use Topics    Smoking status: Former Smoker     Packs/day: 1.00     Years: 60.00     Types: Cigarettes     Quit date: 1/22/2016    Smokeless tobacco: Never Used    Alcohol use No                                Counseling given: Not Answered      Vital Signs (Current):   Vitals:    08/28/18 1454   Weight: 97 lb (44 kg)   Height: 5' 5\" (1.651 m)                                              BP Readings from Last 3 Encounters:   08/28/18 120/68   06/25/18 122/65   05/15/18 114/77       NPO Status:                                                                                 BMI:   Wt Readings from Last 3 Encounters:   08/28/18 97 lb (44 kg)   07/16/18 100 lb (45.4 kg)   06/25/18 100 lb (45.4 kg)     Body mass index is 16.14 kg/m².    CBC:   Lab Results   Component Value Date    WBC 10.4 11/21/2016    RBC 4.62 11/21/2016    HGB 13.9 11/21/2016    HCT 41.8 11/21/2016    MCV 90.5 11/21/2016    RDW 14.1 11/21/2016     11/21/2016       CMP:   Lab Results   Component Value Date     08/28/2018    K 4.3 08/28/2018     08/28/2018    CO2 29 08/28/2018    BUN 14 08/28/2018    CREATININE 0.8 08/28/2018    GFRAA >60 08/28/2018    AGRATIO 1.3 08/28/2018    LABGLOM >60 08/28/2018    GLUCOSE 113 08/28/2018    PROT 7.6 08/28/2018    CALCIUM 9.7 08/28/2018    BILITOT <0.2 08/28/2018    ALKPHOS 64 08/28/2018    AST 16 08/28/2018    ALT 9 08/28/2018       POC Tests: No results for input(s): POCGLU, POCNA, POCK, POCCL, POCBUN, POCHEMO, POCHCT in the last 72 hours. Coags: No results found for: PROTIME, INR, APTT    HCG (If Applicable): No results found for: PREGTESTUR, PREGSERUM, HCG, HCGQUANT     ABGs: No results found for: PHART, PO2ART, KBY4TOZ, UOM7SLW, BEART, G7FMAGXZ     Type & Screen (If Applicable):  No results found for: LABABO, [address]    Anesthesia Evaluation  Patient summary reviewed and Nursing notes reviewed no history of anesthetic complications:   Airway: Mallampati: II  TM distance: >3 FB   Neck ROM: full  Mouth opening: > = 3 FB Dental:    (+) upper dentures and lower dentures      Pulmonary:Negative Pulmonary ROS and normal exam  breath sounds clear to auscultation  (+) COPD:      (-) pneumonia                           Cardiovascular:Negative CV ROS    (+) CAD:, CABG/stent (s/p CABG x 3 in 1993. no chest pain. > 4 mets. no anticoagulation. ):, CHF (EF 45%):,         Rhythm: regular                   ROS comment: No chest pain. > 4 mets. No anticoagulation.       Neuro/Psych:   Negative Neuro/Psych ROS  (+) neuromuscular disease:,              ROS comment: Chronic low  Back pain with right sided sciatica GI/Hepatic/Renal: Neg GI/Hepatic/Renal ROS       (-) hiatal hernia and GERD       Endo/Other: Negative Endo/Other ROS                    Abdominal:           Vascular:                                   NPO > MN    SR    ECHO  Summary   Left ventricular size is normal .   There is akinesis of the basal/mid inferior and inferoseptal walls.   Ejection fraction is visually estimated to be   36-60 %.   Diastolic filling parameters suggests grade I diastolic dysfunction .   Mitral annular calcification is present.   Aortic valve appears sclerotic but opens adequately.      Signature    8/7/18 [facility] Cardiac clearance    Pre-Operative Diagnosis: ROTATOR CUFF TEAR LEFT SHOULDER    80 y.o.   BMI:  Body mass index is 16.14 kg/m². Vitals:    08/28/18 1454 09/04/18 0652   BP:  112/61   Pulse:  71   Resp:  22   Temp:  97.9 °F (36.6 °C)   TempSrc:  Temporal   SpO2:  94%   Weight: 97 lb (44 kg) 97 lb (44 kg)   Height: 5' 5\" (1.651 m) 5' 5\" (1.651 m)       No Known Allergies    Social History   Substance Use Topics    Smoking status: Former Smoker     Packs/day: 1.00     Years: 60.00     Types: Cigarettes     Quit date: 1/22/2016    Smokeless tobacco: Never Used    Alcohol use No       LABS:    CBC  Lab Results   Component Value Date/Time    WBC 10.4 11/21/2016 08:15 AM    HGB 13.9 11/21/2016 08:15 AM    HCT 41.8 11/21/2016 08:15 AM     11/21/2016 08:15 AM     RENAL  Lab Results   Component Value Date/Time     08/28/2018 10:45 AM    K 4.3 08/28/2018 10:45 AM     08/28/2018 10:45 AM    CO2 29 08/28/2018 10:45 AM    BUN 14 08/28/2018 10:45 AM    CREATININE 0.8 08/28/2018 10:45 AM    GLUCOSE 113 (H) 08/28/2018 10:45 AM     COAGS  No results found for: PROTIME, INR, APTT     Anesthesia Plan      general     ASA 3     (I discussed with the patient, wife and daughter the risks and benefits of PIV, general anesthesia, IV Narcotics, PACU. All questions were answered the patient agrees with the plan    Dr. Rodrigo Osuna will be doing exparel for this procedure.  )  Induction: intravenous.       Anesthetic plan and risks

## 2018-09-04 NOTE — H&P
I have reviewed the history and physical and examined the patient and find no relevant changes. I have reviewed with the patient and/or family the risks, benefits, and alternatives to the procedure. Controlled Substances Monitoring:     RX Monitoring 9/4/2018   Attestation The Prescription Monitoring Report for this patient was reviewed today. Documentation No signs of potential drug abuse or diversion identified. Acute Pain Prescriptions Severe pain not adequately treated with lower dose.

## 2018-09-04 NOTE — ANESTHESIA POSTPROCEDURE EVALUATION
src:Temporal, Pulse:71, Resp:22, SpO2:94 %, Height:5' 5\" (1.651 m), Weight:97 lb (44 kg)     LABS:    CBC  Lab Results       Component                Value               Date/Time                  WBC                      10.4                11/21/2016 08:15 AM        HGB                      13.9                11/21/2016 08:15 AM        HCT                      41.8                11/21/2016 08:15 AM        PLT                      221                 11/21/2016 08:15 AM   RENAL  Lab Results       Component                Value               Date/Time                  NA                       140                 08/28/2018 10:45 AM        K                        4.3                 08/28/2018 10:45 AM        CL                       100                 08/28/2018 10:45 AM        CO2                      29                  08/28/2018 10:45 AM        BUN                      14                  08/28/2018 10:45 AM        CREATININE               0.8                 08/28/2018 10:45 AM        GLUCOSE                  113 (H)             08/28/2018 10:45 AM   COAGS  No results found for: PROTIME, INR, APTT    Intake & Output: In: 900 (I.V.:900)  Out: -     Nausea & Vomiting:  No    Level of Consciousness:  Awake    Pain Assessment:  Adequate analgesia    Anesthesia Complications:  No apparent anesthetic complications    SUMMARY      Vital signs stable  OK to discharge from Stage I post anesthesia care.   Care transferred from Anesthesiology department on discharge from perioperative area

## 2018-09-04 NOTE — PROGRESS NOTES
HHN completed. Breath sounds less diminished upper lobes but still has rales lower lobes.   Resp, non-labored at rest.

## 2018-09-04 NOTE — PROGRESS NOTES
SHELLEYN treatemnt with duoneb 0620 am - pt family states he \"DOES\" still smoke   Pt denies SOB  Respirations easy unlabored at rest

## 2018-09-05 LAB
EKG ATRIAL RATE: 66 BPM
EKG DIAGNOSIS: NORMAL
EKG P AXIS: 52 DEGREES
EKG P-R INTERVAL: 134 MS
EKG Q-T INTERVAL: 442 MS
EKG QRS DURATION: 138 MS
EKG QTC CALCULATION (BAZETT): 463 MS
EKG R AXIS: 104 DEGREES
EKG T AXIS: 23 DEGREES
EKG VENTRICULAR RATE: 66 BPM

## 2018-09-07 ENCOUNTER — OFFICE VISIT (OUTPATIENT)
Dept: ORTHOPEDIC SURGERY | Age: 83
End: 2018-09-07

## 2018-09-07 VITALS — HEIGHT: 65 IN | BODY MASS INDEX: 16.66 KG/M2 | WEIGHT: 100 LBS

## 2018-09-07 DIAGNOSIS — Z98.890 S/P LEFT ROTATOR CUFF REPAIR: Primary | ICD-10-CM

## 2018-09-07 PROCEDURE — 99024 POSTOP FOLLOW-UP VISIT: CPT | Performed by: PHYSICIAN ASSISTANT

## 2018-09-07 RX ORDER — HYDROCODONE BITARTRATE AND ACETAMINOPHEN 5; 325 MG/1; MG/1
.5-1 TABLET ORAL EVERY 8 HOURS PRN
Qty: 20 TABLET | Refills: 0 | Status: SHIPPED | OUTPATIENT
Start: 2018-09-07 | End: 2018-09-14

## 2018-09-07 NOTE — PROGRESS NOTES
Chief Complaint   Patient presents with    Post-Op Check     LEFT shoulder mini open RTC repair with laurent procedure and neer acromioplasty 9/4/18     1. Examination under anesthesia and video arthroscopy of the left  shoulder. 2.  Mini-open rotator cuff repair using two Smith and Nephew HEALICOIL  anchors and two Carranza and Nephew MULTIFIX anchors. 3.  Arthroscopic Neer acromioplasty. 4.  Arthroscopic Laurent procedure. 5.  Limited debridement of the shoulder undersurface rotator cuff tear and  frayed labrum. History of present illness: The patient returns today for their first postoperative visit after left shoulder arthroscopy and mini open rotator cuff repair performed on 9/4/2018. Overall he is doing well. He describes some nausea from the pain pills. He denies numbness or tingling. Pain Assessment  Location of Pain: Shoulder  Location Modifiers: Left  Severity of Pain: 7  Quality of Pain: Aching, Throbbing  Duration of Pain: Persistent  Frequency of Pain: Constant]    Physical examination: Inspection reveals expected swelling. Incisions are clean, dry, and intact. No signs of infection. Range of motion limited by pain and swelling. Strength was not assessed today. Neurovascular exam is intact. Assessment/plan: The patient is doing well after shoulder arthroscopy and rotator cuff repair. Surgical findings were reviewed. They will remain in her sling for 3 weeks. They may remove the sling to work on range of motion exercises of the elbow only. I have recommended ice and judicious use of NSAIDs with GI precautions. The patient may remove the sling for bathing and exercises only. We will see the patient back in 3 weeks with x-rays. At next visit, we will begin physical therapy. They verbalized good understanding of the plan.

## 2018-09-19 ENCOUNTER — OFFICE VISIT (OUTPATIENT)
Dept: ORTHOPEDIC SURGERY | Age: 83
End: 2018-09-19

## 2018-09-19 VITALS — HEIGHT: 65 IN | BODY MASS INDEX: 16.66 KG/M2 | WEIGHT: 100 LBS

## 2018-09-19 DIAGNOSIS — M25.512 LEFT SHOULDER PAIN, UNSPECIFIED CHRONICITY: Primary | ICD-10-CM

## 2018-09-19 PROCEDURE — 99024 POSTOP FOLLOW-UP VISIT: CPT | Performed by: PHYSICIAN ASSISTANT

## 2018-09-19 NOTE — PROGRESS NOTES
1.  Examination under anesthesia and video arthroscopy of the left  shoulder. 9/4/2018  2.  Mini-open rotator cuff repair using two Kelleen Mendosa and Nephew HEALICOIL  anchors and two Carranza and Nephew MULTIFIX anchors. 3.  Arthroscopic Neer acromioplasty. 4.  Arthroscopic Khadra procedure. 5.  Limited debridement of the shoulder undersurface rotator cuff tear and  frayed labrum    History of present illness: The patient returns today after shoulder arthroscopy. Pain control has been satisfactory with oral medications. He is normotensive home exercises. He is actually complaining of some contralateral shoulder pain through the posterior aspect and wanted to have this checked. He denies any numbness tingling. He thinks it might be from overuse. He does not have any trouble raising the arm. Pain Assessment  Location of Pain: Shoulder  Location Modifiers: Right  Severity of Pain: 10  Quality of Pain: Aching  Duration of Pain: Persistent  Frequency of Pain: Constant  Aggravating Factors: Bending, Stretching, Straightening]    Physical examination:  Incisions are well-healed with no signs of infection. Neurovascular exam is intact. He can perform some table slides for me without pain. I also evaluated the RIGHT shoulder . Mild muscular skeletal soreness in the posterior shoulder. Neurovascularly intact along the RIGHT upper extremity. Strength and range of motion are within normal limits. He does not have any significant pain with testing today. 3 x-ray views of the LEFT shoulder were obtained today. Postsurgical changes noted but no evidence of acute abnormality    Assessment/plan:   The patient was concerned he was having some contralateral shoulder pain but really functionally he doesn't have any deficits. I offered him a cortisone injection but he doesn't think he needs it. He is given a start taking his sling off and working on some increased range of motion exercises on the LEFT.   We will plan to

## 2018-10-10 ENCOUNTER — CARE COORDINATION (OUTPATIENT)
Dept: INTERNAL MEDICINE CLINIC | Age: 83
End: 2018-10-10

## 2018-10-10 RX ORDER — OXYCODONE HYDROCHLORIDE AND ACETAMINOPHEN 5; 325 MG/1; MG/1
1 TABLET ORAL EVERY 8 HOURS PRN
COMMUNITY
End: 2019-01-29 | Stop reason: CLARIF

## 2018-10-10 NOTE — CARE COORDINATION
Addressed             Most Recent       Care Coordination     Nutrition Plan   Worsening (10/10/2018)             I will monitor weight and increase protein to prevent muscle wasting    Barriers: impairment:  none  Plan for overcoming my barriers: N/A  Confidence: 8/10  Anticipated Goal Completion Date: 12/18    Weight continues to decline. 97 lbs. Using nutritional supplements bid and dietician active. Follow up scheduled at [place] for evaluation of gastric stent. 10/10/18 TRB         General     COMPLETED: Care Coordination Self Management   No change (5/23/2018)             CC Self Management Goals   Patient Goal (What steps will patient take to achieve goal?): Alternate activity with rest periods prn  Patient is able to discuss self-management of condition(s):  Pt demonstrates adherence to medications  Pt demonstrates understanding of self-monitoring  Patient is able to identify Red Flags:  Alert to potential adverse drug reactions(s) or side effects and actions to take should they arise  Discuss target symptoms and actions to take should they arise  Identify problems that require immediate PCP or specialist visit  Patient demonstrates understanding of access to PCP/Specialist:  Understands about scheduling routine Follow Up appointments   Understands about sick day appointment options for worsening of symptoms/progression (Same Day, E Visits)            Prior to Admission medications    Medication Sig Start Date End Date Taking? Authorizing Provider   oxyCODONE-acetaminophen (PERCOCET) 5-325 MG per tablet Take 1 tablet by mouth every 8 hours as needed for Pain. .   Yes Historical Provider, MD   omeprazole (PRILOSEC) 20 MG delayed release capsule Take 20 mg by mouth daily    Historical Provider, MD   atorvastatin (LIPITOR) 80 MG tablet TAKE 1 TABLET BY MOUTH EVERY DAY. For high cholesterol 2/5/18   Shahriar Wu MD   Tiotropium Bromide-Olodaterol 2.5-2.5 MCG/ACT AERS Inhale 2 puffs into the lungs daily 1/17/18

## 2018-10-11 ENCOUNTER — NURSE ONLY (OUTPATIENT)
Dept: INTERNAL MEDICINE CLINIC | Age: 83
End: 2018-10-11
Payer: MEDICARE

## 2018-10-11 DIAGNOSIS — Z23 NEED FOR PROPHYLACTIC VACCINATION AND INOCULATION AGAINST INFLUENZA: Primary | ICD-10-CM

## 2018-10-11 PROCEDURE — 90662 IIV NO PRSV INCREASED AG IM: CPT | Performed by: NURSE PRACTITIONER

## 2018-10-11 PROCEDURE — G0008 ADMIN INFLUENZA VIRUS VAC: HCPCS | Performed by: NURSE PRACTITIONER

## 2018-10-12 ENCOUNTER — OFFICE VISIT (OUTPATIENT)
Dept: ORTHOPEDIC SURGERY | Age: 83
End: 2018-10-12

## 2018-10-12 VITALS — BODY MASS INDEX: 16.68 KG/M2 | HEIGHT: 65 IN | WEIGHT: 100.09 LBS

## 2018-10-12 DIAGNOSIS — Z98.890 S/P ARTHROSCOPY OF LEFT SHOULDER: Primary | ICD-10-CM

## 2018-10-12 PROCEDURE — 99024 POSTOP FOLLOW-UP VISIT: CPT | Performed by: PHYSICIAN ASSISTANT

## 2018-11-08 ENCOUNTER — OFFICE VISIT (OUTPATIENT)
Dept: ORTHOPEDIC SURGERY | Age: 83
End: 2018-11-08

## 2018-11-08 VITALS — BODY MASS INDEX: 16.68 KG/M2 | HEIGHT: 65 IN | WEIGHT: 100.09 LBS

## 2018-11-08 DIAGNOSIS — Z98.890 S/P LEFT ROTATOR CUFF REPAIR: Primary | ICD-10-CM

## 2018-11-08 PROCEDURE — 99024 POSTOP FOLLOW-UP VISIT: CPT | Performed by: PHYSICIAN ASSISTANT

## 2018-11-23 ENCOUNTER — TELEPHONE (OUTPATIENT)
Dept: INTERNAL MEDICINE CLINIC | Age: 83
End: 2018-11-23

## 2018-11-23 NOTE — TELEPHONE ENCOUNTER
C/O \"a big water blister\" on left knee which he thinks needs to be drained. About the size of a baseball. No injury. Denies pain, redness, any symptoms other than the swelling. Able to walk/ use knee normally. \"It's just a nuisance. \" Advised him that I would recommend that someone examine him soon but our office is closed today. He already is established with [facility], so could contact them to see if they can get him in. If knee becomes painful or he develops other symptoms (fever, etc.), then should go to ER or Urgent Care this weekend. Otherwise, it should be fine to wait for an appointment in the office early next week.

## 2018-11-30 ENCOUNTER — OFFICE VISIT (OUTPATIENT)
Dept: ORTHOPEDIC SURGERY | Age: 83
End: 2018-11-30
Payer: MEDICARE

## 2018-11-30 VITALS — WEIGHT: 100.09 LBS | BODY MASS INDEX: 16.68 KG/M2 | HEIGHT: 65 IN

## 2018-11-30 DIAGNOSIS — M70.42 PREPATELLAR BURSITIS OF LEFT KNEE: ICD-10-CM

## 2018-11-30 DIAGNOSIS — M25.562 LEFT KNEE PAIN, UNSPECIFIED CHRONICITY: Primary | ICD-10-CM

## 2018-11-30 PROCEDURE — G8598 ASA/ANTIPLAT THER USED: HCPCS | Performed by: ORTHOPAEDIC SURGERY

## 2018-11-30 PROCEDURE — 1101F PT FALLS ASSESS-DOCD LE1/YR: CPT | Performed by: ORTHOPAEDIC SURGERY

## 2018-11-30 PROCEDURE — 99214 OFFICE O/P EST MOD 30 MIN: CPT | Performed by: ORTHOPAEDIC SURGERY

## 2018-11-30 PROCEDURE — G8419 CALC BMI OUT NRM PARAM NOF/U: HCPCS | Performed by: ORTHOPAEDIC SURGERY

## 2018-11-30 PROCEDURE — G8427 DOCREV CUR MEDS BY ELIG CLIN: HCPCS | Performed by: ORTHOPAEDIC SURGERY

## 2018-11-30 PROCEDURE — 4040F PNEUMOC VAC/ADMIN/RCVD: CPT | Performed by: ORTHOPAEDIC SURGERY

## 2018-11-30 PROCEDURE — G8482 FLU IMMUNIZE ORDER/ADMIN: HCPCS | Performed by: ORTHOPAEDIC SURGERY

## 2018-11-30 PROCEDURE — 4004F PT TOBACCO SCREEN RCVD TLK: CPT | Performed by: ORTHOPAEDIC SURGERY

## 2018-11-30 PROCEDURE — 1123F ACP DISCUSS/DSCN MKR DOCD: CPT | Performed by: ORTHOPAEDIC SURGERY

## 2018-12-04 ENCOUNTER — HOSPITAL ENCOUNTER (OUTPATIENT)
Dept: PHYSICAL THERAPY | Age: 83
Setting detail: THERAPIES SERIES
Discharge: HOME | End: 2018-12-04
Payer: MEDICARE

## 2018-12-04 PROCEDURE — 97140 MANUAL THERAPY 1/> REGIONS: CPT

## 2018-12-04 PROCEDURE — 97161 PT EVAL LOW COMPLEX 20 MIN: CPT

## 2018-12-04 PROCEDURE — G8982 BODY POS GOAL STATUS: HCPCS

## 2018-12-04 PROCEDURE — G8981 BODY POS CURRENT STATUS: HCPCS

## 2018-12-04 PROCEDURE — 97110 THERAPEUTIC EXERCISES: CPT

## 2018-12-04 NOTE — PROGRESS NOTES
Shoulder ER C7 T2 40 90 3+/5 4/5    Elbow Flexion     /5 /5    Elbow Extension      /5 /5    Pronation     /5 /5    Supination     /5 /5    Wrist Flexion     /5 /5    Wrist Extension     /5 /5    Radial Deviation     /5 /5    Ulnar Deviation     /5 /5         /5 /5      Flexibility  limited    Joint Mobility/Accessory Movements  Pt has G-H capsular restrictions with inf glide, IR/ER spins, ant and post glides. Palpation/Tenderness  Minimal tendersness over rot cuff insertions. Special Tests  NT  Test Right Left Comments                                           Functional Outcome Measure    Measure used:  SPADI  Score:  55  % Disability:  42%       ASSESSMENT  Pt presents 3 months after rot cuff repair with considerable capsular restrictions of the rot cuff and global weakness. PT should be very helpful. GCode:   CK    Problems     Decreased Range of Motion   Decreased Strength   Decreased Joint Mobility       Decreased Flexibility   Poor Posture/Alignment   Increased Pain    Rehabilitation Potential: Good for goals listed below. Strengths for achieving goals include: Motivated. Limitations for achieving goals include:  Severity of restrictions    Prognosis: [x]    Good []    Fair []    Poor    GOALS  GCode:    Short Term Goals ( 4   weeks) Long Term Goals (   weeks)   1). Establish HEP 1). will establish 8 week goals if needed. 2).pain scale 0-2/10 2). 3). AROM:  115 flx  100 abd 3). 4). PROM:  125 flx   120 abd    65ER 4). 5).strength to 4 to 4+ 5). 6).imporve use 50% 6). PLAN OF CARE    To see patient  1-2  x/week for  4-6 weeks for the following treatment interventions:   Therapeutic Exercise   Progressive Resistive Exercise   Modalities of Choice (Heat/Cold/US/EStim/Ionto)   Home Exercise Program   Manual Techniques/Mobilization   Postural Reeducation    Thank you for the referral of this patient.       Timed Code Treatment Minutes:  45 minutes      Total Treatment

## 2018-12-04 NOTE — FLOWSHEET NOTE
Outpatient Physical Therapy     [] Daily Treatment Note   [] Progress Note   [] Discharge Note      Date:  2018    Patient Name:  Jose Manuel Pinedo        :  1932    Restrictions/Precautions:      Diagnosis:  S/p left sholder rot cuff repair. 18    Treatment Diagnosis:      Insurance/Certification information:      Referring Physician:      Plan of care signed (Y/N):      Visit# / total visits:  /    Pain level: /10     Subjective:               Exercises: rot cuff repair  18  Exercise/Equipment Resistance/Repetitions Other comments     18 explained course and role of PT       Wand:   Flexion stretch Hold 10 sec x 5-10             ER stretch from forehead to pillow \"         Standing extension 3x10    Table slides: flx   Hold 10 sec  5-10                         ER Hold 10 sec  5-10    Sleeper stretch-IR   \"                 \"                                                                                             Therapeutic Exercise:  15 min    Group Therapy:      Home Exercise Program:given HO      Therapeutic Activity:      Neuromuscular Re-education:      Gait:      Manual Therapy:  G-H m,obiliz left sholder . Inf glide  IR/ER spins  Will add ant and post glides.     Canalith Repositioning Procedure:       Modalities:      Timed Code Treatment Minutes:  45    Total Treatment Minutes:  70  Medicare Cap Total YTD:  164.00    Treatment/Activity Tolerance:    [x] Patient tolerated treatment well [] Patient limited by fatigue   [] Patient limited by pain [] Patient limited by other medical complications   [] Other:     Prognosis: [x] Good [] Fair  [] Poor    Patient Requires Follow-up:  [x] Yes  [] No    Plan: [] Continue per plan of care [] Alter current plan (see comments)   [x] Plan of care initiated [] Hold pending MD visit [] Discharge    Plan for Next Session:  jam singh, advance HEP  See Progress Note: [] Yes  [x] No       Next due:    1/3/18     Electronically signed by:

## 2018-12-10 ENCOUNTER — HOSPITAL ENCOUNTER (OUTPATIENT)
Dept: PHYSICAL THERAPY | Age: 83
Setting detail: THERAPIES SERIES
Discharge: HOME | End: 2018-12-10
Payer: MEDICARE

## 2018-12-10 ENCOUNTER — CARE COORDINATION (OUTPATIENT)
Dept: INTERNAL MEDICINE CLINIC | Age: 83
End: 2018-12-10

## 2018-12-10 PROCEDURE — 97140 MANUAL THERAPY 1/> REGIONS: CPT

## 2018-12-10 NOTE — FLOWSHEET NOTE
Arthroscopic Khadra procedure. 5.  Limited debridement of the shoulder undersurface rotator cuff tear and  frayed labrum.        Pain    Patient describes pain to be intermit with reaching over head or to get his wallet. He has morning pain and stiffness. States he is abl eto sleep on the left side now but does have some pain with it. Patient reports  0 /10 pain at rest, 2-3 /10 pain with movement. With too much stretching 8/10  Worsened by - stretching, doing wall slides   Improved by - doesn't use ice recently  Current Functional Limitations:  Does everything he needs to. PLOF:  He had problems with his shoulder for several months before surgery        Patient goal for therapy:  Less pian, get stronger , better motion.                Exercises: rot cuff repair  9/4/18  Exercise/Equipment Resistance/Repetitions Other comments     12/4/18 explained course and role of PT       Wand:   Flexion stretch Hold 10 sec x 5-10             ER stretch from forehead to pillow \"         Standing extension 3x10    Table slides: flx   Hold 10 sec  5-10                         ER Hold 10 sec  5-10    Sleeper stretch-IR   \"                 \"    12/10/18 cont ex above                                                                                         Therapeutic Exercise:  15 min    Group Therapy:      Home Exercise Program:given HO      Therapeutic Activity:      Neuromuscular Re-education:      Gait:      Manual Therapy:  G-H m,obiliz left sholder . Inf glide  IR/ER spins    post glides.   Will add ant glide    Canalith Repositioning Procedure:       Modalities:      Timed Code Treatment Minutes:    25    Total Treatment Minutes: 30    Medicare Cap Total YTD:   na    Treatment/Activity Tolerance:    [x] Patient tolerated treatment well [] Patient limited by fatigue   [] Patient limited by pain [] Patient limited by other medical complications   [] Other:     Prognosis: [x] Good [] Fair  [] Poor    Patient Requires

## 2018-12-11 ENCOUNTER — APPOINTMENT (OUTPATIENT)
Dept: PHYSICAL THERAPY | Age: 83
End: 2018-12-11
Payer: MEDICARE

## 2018-12-12 ENCOUNTER — HOSPITAL ENCOUNTER (OUTPATIENT)
Dept: PHYSICAL THERAPY | Age: 83
Setting detail: THERAPIES SERIES
Discharge: HOME | End: 2018-12-12
Payer: MEDICARE

## 2018-12-12 PROCEDURE — 97140 MANUAL THERAPY 1/> REGIONS: CPT

## 2018-12-12 NOTE — FLOWSHEET NOTE
Arthroscopic Khadra procedure. 5.  Limited debridement of the shoulder undersurface rotator cuff tear and  frayed labrum.        Pain    Patient describes pain to be intermit with reaching over head or to get his wallet. He has morning pain and stiffness. States he is abl eto sleep on the left side now but does have some pain with it. Patient reports  0 /10 pain at rest, 2-3 /10 pain with movement. With too much stretching 8/10  Worsened by - stretching, doing wall slides   Improved by - doesn't use ice recently  Current Functional Limitations:  Does everything he needs to. PLOF:  He had problems with his shoulder for several months before surgery        Patient goal for therapy:  Less pian, get stronger , better motion.                Exercises: rot cuff repair  9/4/18  Exercise/Equipment Resistance/Repetitions Other comments     12/4/18 explained course and role of PT       Wand:   Flexion stretch Hold 10 sec x 5-10             ER stretch from forehead to pillow \"         Standing extension 3x10    Table slides: flx   Hold 10 sec  5-10                         ER Hold 10 sec  5-10    Sleeper stretch-IR   \"                 \"    12/10/18 cont ex above                                                                                         Therapeutic Exercise:   min    Group Therapy:      Home Exercise Program:given HO      Therapeutic Activity:      Neuromuscular Re-education:      Gait:      Manual Therapy:  30     G-H mobiliz left sholder . Inf glide  IR/ER spins    post glides.   ant glide  PROM      Modalities:      Timed Code Treatment Minutes:    30    Total Treatment Minutes: 30  2 MAN    Medicare Cap Total YTD:   na    Treatment/Activity Tolerance:    [x] Patient tolerated treatment well [] Patient limited by fatigue   [] Patient limited by pain [] Patient limited by other medical complications   [] Other:     Prognosis: [x] Good [] Fair  [] Poor    Patient Requires Follow-up:  [x] Yes  []

## 2018-12-17 ENCOUNTER — HOSPITAL ENCOUNTER (OUTPATIENT)
Dept: PHYSICAL THERAPY | Age: 83
Setting detail: THERAPIES SERIES
Discharge: HOME | End: 2018-12-17
Payer: MEDICARE

## 2018-12-17 PROCEDURE — 97140 MANUAL THERAPY 1/> REGIONS: CPT

## 2018-12-17 NOTE — FLOWSHEET NOTE
Outpatient Physical Therapy     [x] Daily Treatment Note   [] Progress Note   [] Discharge Note      Date:  2018    Patient Name:  Cleopatra Maldonado        :  1932    Restrictions/Precautions:      Diagnosis:  S/p left sholder rot cuff repair. 18    Treatment Diagnosis:  Same and shoulder pain    Insurance/Certification information:  humana medi gold    Referring Physician:  DENISHA Nayak. Plan of care signed (Y/N):      Visit# / total visits:  3    Pain level:    0/10 at times    2-3/10 often   8/10 too much stretching. Subjective:  12/10/18 reports his shoulder is sore with exercises. 18 states he uses a sling after surgery. He  Had to have another surgery when he was out of his sling for stints in his stomach and was not able to start therapy for his shoulder. Now he has some limited movement , weakness, and some pain in his shoulder.        PATIENT NAME: Kirstie Coombs              IRU:        1932  MED REC NO:   [id number]                          ROOM:  ACCOUNT NO:   [id number]                           ADMIT DATE: 2018  PROVIDER: Ann-Marie Neal MD     DATE OF PROCEDURE:  2018     SERVICE:  Orthopedic Surgery.     SURGEON: Taya Smith MD     FIRST ASSISTANT: LUCILLE Carrillo.     SECOND ASSISTANT: George Rivas SA.     PREOPERATIVE DIAGNOSIS:  Full-thickness left rotator cuff tear with  associated impingement and AC joint arthritis.     POSTOPERATIVE DIAGNOSES:  1.  Full-thickness supraspinatus tear approximately 2 cm in size with some  mild extension into the infraspinatus. 2.  Chronic rotator cuff impingement. 3.  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     OPERATION PERFORMED:  1.  Examination under anesthesia and video arthroscopy of the left  shoulder. 2.  Mini-open rotator cuff repair using two [employer] HEALICOIL  anchors and two Carranza [employer]hew MULTIFIX anchors.   3.  Arthroscopic Nejesus acromioplasty. 4.  Arthroscopic Khadra procedure. 5.  Limited debridement of the shoulder undersurface rotator cuff tear and  frayed labrum.        Pain    Patient describes pain to be intermit with reaching over head or to get his wallet. He has morning pain and stiffness. States he is abl eto sleep on the left side now but does have some pain with it. Patient reports  0 /10 pain at rest, 2-3 /10 pain with movement. With too much stretching 8/10  Worsened by - stretching, doing wall slides   Improved by - doesn't use ice recently  Current Functional Limitations:  Does everything he needs to. PLOF:  He had problems with his shoulder for several months before surgery        Patient goal for therapy:  Less pian, get stronger , better motion.                Exercises: rot cuff repair  9/4/18  Exercise/Equipment Resistance/Repetitions Other comments     12/4/18 explained course and role of PT       Wand:   Flexion stretch Hold 10 sec x 5-10             ER stretch from forehead to pillow \"         Standing extension 3x10    Table slides: flx   Hold 10 sec  5-10                         ER Hold 10 sec  5-10    Sleeper stretch-IR   \"                 \"    12/10/18 cont ex above         NV- start some prone PREs, sidelying ER with wt, therabd for mid and low rows and IR/ER. Cont aggressive mobiiz of the left shoulder. Therapeutic Exercise:   min    Group Therapy:      Home Exercise Program:given HO      Therapeutic Activity:      Neuromuscular Re-education:      Gait:      Manual Therapy:  30     G-H mobiliz left sholder . Inf glide  IR/ER spins    post glides.   ant glide  PROM      Modalities:      Timed Code Treatment Minutes:    30    Total Treatment Minutes: 30                              2 MAN    Medicare Cap Total YTD:   na    Treatment/Activity Tolerance:    [x] Patient tolerated treatment well [] Patient limited by fatigue   []

## 2018-12-19 ENCOUNTER — HOSPITAL ENCOUNTER (OUTPATIENT)
Dept: PHYSICAL THERAPY | Age: 83
Setting detail: THERAPIES SERIES
Discharge: HOME | End: 2018-12-19
Payer: MEDICARE

## 2018-12-19 PROCEDURE — 97110 THERAPEUTIC EXERCISES: CPT

## 2018-12-19 PROCEDURE — 97140 MANUAL THERAPY 1/> REGIONS: CPT

## 2018-12-19 NOTE — FLOWSHEET NOTE
Simon MULTIFIX anchors. 3.  Arthroscopic Neer acromioplasty. 4.  Arthroscopic Khadra procedure. 5.  Limited debridement of the shoulder undersurface rotator cuff tear and  frayed labrum.        Pain    Patient describes pain to be intermit with reaching over head or to get his wallet. He has morning pain and stiffness. States he is abl eto sleep on the left side now but does have some pain with it. Patient reports  0 /10 pain at rest, 2-3 /10 pain with movement. With too much stretching 8/10  Worsened by - stretching, doing wall slides   Improved by - doesn't use ice recently  Current Functional Limitations:  Does everything he needs to. PLOF:  He had problems with his shoulder for several months before surgery        Patient goal for therapy:  Less pian, get stronger , better motion.                Exercises: rot cuff repair  9/4/18  Exercise/Equipment Resistance/Repetitions Other comments     12/4/18 explained course and role of PT       Wand:   Flexion stretch Hold 10 sec x 5-10             ER stretch from forehead to pillow \"         Standing extension 3x10    Table slides: flx   Hold 10 sec  5-10                         ER Hold 10 sec  5-10    Sleeper stretch-IR   \"                 \"    12/10/18 cont ex above       sidelying ER 1# 2x10    Prone row 1# 2x10    Prone ext 1# 2x10      NV-  therabd for mid and low rows and IR/ER. Cont aggressive mobiiz of the left shoulder. Therapeutic Exercise: 10  min    Group Therapy:      Home Exercise Program:given HO      Therapeutic Activity:      Neuromuscular Re-education:      Gait:      Manual Therapy:  23    G-H mobiliz left sholder . Inf glide  IR/ER spins    post glides.   ant glide  PROM      Modalities:      Timed Code Treatment Minutes:    33    Total Treatment Minutes: 33                             1 MAN 1 ex    Medicare Cap Total YTD:   na    Treatment/Activity Tolerance: [x] Patient tolerated treatment well [] Patient limited by fatigue   [] Patient limited by pain [] Patient limited by other medical complications   [] Other:     Prognosis: [x] Good [] Fair  [] Poor    Patient Requires Follow-up:  [x] Yes  [] No    Plan: [x] Continue per plan of care [] Alter current plan (see comments)   [] Plan of care initiated [] Hold pending MD visit [] Discharge    Plan for Next Session:  g-h mobiliz, start some PREs     advance HEP  See Progress Note: [] Yes  [x] No       Next due:    1/3/18     Electronically signed by:  Tatum Vitale [id number]          Patient Name:  Nurys Donohue      :  1932       Plan of care signed (Y/N):      Visit# / total visits:  /    Pain level: /10    Progress Note covers period from:   18  To date on this note. Subjective:         Objective:  Observation:  Test measurements:       GCODEs and Functional Outcome Measure:        Functional Outcome Measure    Measure used:  SPADI  Score:  55  % Disability:  42%       Assessment:  Summary:   Patient's response to treatment:      Goals:GOALS  GCode:    Short Term Goals ( 4   weeks) Long Term Goals (   weeks)   1). Establish HEP 1). will establish 8 week goals if needed. 2).pain scale 0-2/10 2). 3). AROM:  115 flx  100 abd 3). 4). PROM:  125 flx   120 abd    65ER 4). 5).strength to 4 to 4+ 5).    6).imporve use 50% 6).        · Progress toward previous goals:      Plan:  ·     Electronically Signed by: , PT

## 2018-12-24 ENCOUNTER — APPOINTMENT (OUTPATIENT)
Dept: PHYSICAL THERAPY | Age: 83
End: 2018-12-24
Payer: MEDICARE

## 2019-01-29 ENCOUNTER — OFFICE VISIT (OUTPATIENT)
Dept: INTERNAL MEDICINE CLINIC | Age: 84
End: 2019-01-29
Payer: MEDICARE

## 2019-01-29 VITALS
HEART RATE: 72 BPM | WEIGHT: 98.8 LBS | SYSTOLIC BLOOD PRESSURE: 124 MMHG | DIASTOLIC BLOOD PRESSURE: 52 MMHG | HEIGHT: 65 IN | BODY MASS INDEX: 16.46 KG/M2

## 2019-01-29 DIAGNOSIS — M25.512 ACUTE PAIN OF LEFT SHOULDER: ICD-10-CM

## 2019-01-29 DIAGNOSIS — Z23 NEED FOR PROPHYLACTIC VACCINATION AND INOCULATION AGAINST VARICELLA: ICD-10-CM

## 2019-01-29 DIAGNOSIS — I25.10 CORONARY ARTERY DISEASE INVOLVING NATIVE CORONARY ARTERY OF NATIVE HEART WITHOUT ANGINA PECTORIS: ICD-10-CM

## 2019-01-29 DIAGNOSIS — Z00.00 MEDICARE ANNUAL WELLNESS VISIT, SUBSEQUENT: Primary | ICD-10-CM

## 2019-01-29 PROCEDURE — G8482 FLU IMMUNIZE ORDER/ADMIN: HCPCS | Performed by: NURSE PRACTITIONER

## 2019-01-29 PROCEDURE — G8598 ASA/ANTIPLAT THER USED: HCPCS | Performed by: NURSE PRACTITIONER

## 2019-01-29 PROCEDURE — G0439 PPPS, SUBSEQ VISIT: HCPCS | Performed by: NURSE PRACTITIONER

## 2019-01-29 PROCEDURE — 4040F PNEUMOC VAC/ADMIN/RCVD: CPT | Performed by: NURSE PRACTITIONER

## 2019-01-29 RX ORDER — NABUMETONE 750 MG/1
750 TABLET, FILM COATED ORAL 2 TIMES DAILY
Qty: 60 TABLET | Refills: 3 | Status: SHIPPED | OUTPATIENT
Start: 2019-01-29 | End: 2019-07-02

## 2019-01-29 ASSESSMENT — PATIENT HEALTH QUESTIONNAIRE - PHQ9
SUM OF ALL RESPONSES TO PHQ QUESTIONS 1-9: 2
SUM OF ALL RESPONSES TO PHQ QUESTIONS 1-9: 2

## 2019-01-29 ASSESSMENT — LIFESTYLE VARIABLES: HOW OFTEN DO YOU HAVE A DRINK CONTAINING ALCOHOL: 0

## 2019-01-29 ASSESSMENT — ANXIETY QUESTIONNAIRES: GAD7 TOTAL SCORE: 1

## 2019-02-11 ENCOUNTER — CARE COORDINATION (OUTPATIENT)
Dept: CARE COORDINATION | Age: 84
End: 2019-02-11

## 2019-03-19 ENCOUNTER — HOSPITAL ENCOUNTER (OUTPATIENT)
Age: 84
Discharge: HOME | End: 2019-03-19
Payer: MEDICARE

## 2019-03-19 DIAGNOSIS — I25.10 CORONARY ARTERY DISEASE INVOLVING NATIVE CORONARY ARTERY OF NATIVE HEART WITHOUT ANGINA PECTORIS: ICD-10-CM

## 2019-03-19 LAB
A/G RATIO: 1.2 (ref 1.1–2.2)
ALBUMIN SERPL-MCNC: 4.3 G/DL (ref 3.4–5)
ALP BLD-CCNC: 64 U/L (ref 40–129)
ALT SERPL-CCNC: 8 U/L (ref 10–40)
ANION GAP SERPL CALCULATED.3IONS-SCNC: 12 MMOL/L (ref 3–16)
AST SERPL-CCNC: 20 U/L (ref 15–37)
BILIRUB SERPL-MCNC: 0.3 MG/DL (ref 0–1)
BUN BLDV-MCNC: 14 MG/DL (ref 7–20)
CALCIUM SERPL-MCNC: 9.5 MG/DL (ref 8.3–10.6)
CHLORIDE BLD-SCNC: 101 MMOL/L (ref 99–110)
CHOLESTEROL, TOTAL: 146 MG/DL (ref 0–199)
CO2: 28 MMOL/L (ref 21–32)
CREAT SERPL-MCNC: 0.8 MG/DL (ref 0.8–1.3)
GFR AFRICAN AMERICAN: >60
GFR NON-AFRICAN AMERICAN: >60
GLOBULIN: 3.5 G/DL
GLUCOSE BLD-MCNC: 101 MG/DL (ref 70–99)
HDLC SERPL-MCNC: 43 MG/DL (ref 40–60)
LDL CHOLESTEROL CALCULATED: 80 MG/DL
POTASSIUM SERPL-SCNC: 4.5 MMOL/L (ref 3.5–5.1)
SODIUM BLD-SCNC: 141 MMOL/L (ref 136–145)
TOTAL PROTEIN: 7.8 G/DL (ref 6.4–8.2)
TRIGL SERPL-MCNC: 116 MG/DL (ref 0–150)
VLDLC SERPL CALC-MCNC: 23 MG/DL

## 2019-03-19 PROCEDURE — 36415 COLL VENOUS BLD VENIPUNCTURE: CPT

## 2019-03-19 PROCEDURE — 80061 LIPID PANEL: CPT

## 2019-03-19 PROCEDURE — 80053 COMPREHEN METABOLIC PANEL: CPT

## 2019-04-12 ENCOUNTER — OFFICE VISIT (OUTPATIENT)
Dept: ORTHOPEDIC SURGERY | Age: 84
End: 2019-04-12
Payer: MEDICARE

## 2019-04-12 ENCOUNTER — CARE COORDINATION (OUTPATIENT)
Dept: CARE COORDINATION | Age: 84
End: 2019-04-12

## 2019-04-12 VITALS
DIASTOLIC BLOOD PRESSURE: 74 MMHG | WEIGHT: 98.77 LBS | SYSTOLIC BLOOD PRESSURE: 120 MMHG | BODY MASS INDEX: 16.46 KG/M2 | HEART RATE: 78 BPM | HEIGHT: 65 IN

## 2019-04-12 DIAGNOSIS — M19.071 PRIMARY OSTEOARTHRITIS OF RIGHT FOOT: ICD-10-CM

## 2019-04-12 DIAGNOSIS — M79.671 RIGHT FOOT PAIN: Primary | ICD-10-CM

## 2019-04-12 PROCEDURE — 99213 OFFICE O/P EST LOW 20 MIN: CPT | Performed by: ORTHOPAEDIC SURGERY

## 2019-04-12 PROCEDURE — G8598 ASA/ANTIPLAT THER USED: HCPCS | Performed by: ORTHOPAEDIC SURGERY

## 2019-04-12 PROCEDURE — 4004F PT TOBACCO SCREEN RCVD TLK: CPT | Performed by: ORTHOPAEDIC SURGERY

## 2019-04-12 PROCEDURE — G8427 DOCREV CUR MEDS BY ELIG CLIN: HCPCS | Performed by: ORTHOPAEDIC SURGERY

## 2019-04-12 PROCEDURE — 1123F ACP DISCUSS/DSCN MKR DOCD: CPT | Performed by: ORTHOPAEDIC SURGERY

## 2019-04-12 PROCEDURE — G8419 CALC BMI OUT NRM PARAM NOF/U: HCPCS | Performed by: ORTHOPAEDIC SURGERY

## 2019-04-12 PROCEDURE — L3260 AMBULATORY SURGICAL BOOT EAC: HCPCS | Performed by: ORTHOPAEDIC SURGERY

## 2019-04-12 PROCEDURE — 4040F PNEUMOC VAC/ADMIN/RCVD: CPT | Performed by: ORTHOPAEDIC SURGERY

## 2019-04-12 NOTE — PROGRESS NOTES
Chief Complaint:  Follow-up (right foot patient states was working on  and just started hurting on weds 04/10/2019)      History of Present Illness:  Tristan Ventura is a 80 y.o. male here regarding right foot discomfort, this began Wednesday, April 10 after working on a lawnmower, he has discomfort on the arch of his foot and lateral aspect. He denies any injury. Currently 9 out of 10. With his wife.      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     COPD (chronic obstructive pulmonary disease) (HCC)     Emphysema of lung (HCC)     Hyperlipidemia     Osteoarthritis     Pneumonia      Past Surgical History:   Procedure Laterality Date    CARDIAC SURGERY      stent    COLONOSCOPY      COLONOSCOPY  12 IOct 2015    Diverticulosis    CORONARY ARTERY BYPASS GRAFT  1990    DIAGNOSTIC CARDIAC CATH LAB PROCEDURE      ENDOSCOPY, COLON, DIAGNOSTIC      ENDOSCOPY, COLON, DIAGNOSTIC  12 Oct 2015    biopsy    EYE SURGERY Bilateral     Cataract    HERNIA REPAIR      KNEE ARTHROSCOPY Bilateral     OTHER SURGICAL HISTORY      patient states he had \"a stent put in from his heart to his digestive system\"    FL REPAIR ROTATOR CUFF,ACUTE Left 9/4/2018    EXAM UNDER ANESTHESIA, VIDEO ARTHROSCOPY LEFT SHOULDER, MINI OPEN ROTATOR CUFF REPAIR, NEER ACROMIOPLASTY, POSSIBLE CHRISTIANO PROCEDURE performed by Wicho Tucker MD at [address] [place] [address] PRE-MALIGNANT / Dago Mooney the lungs daily 1 Inhaler 11    aspirin 81 MG tablet Take 81 mg by mouth daily. No current facility-administered medications for this visit. No Known Allergies    Review of Systems:  Constitutional: negative  Respiratory: negative  Cardiovascular: negative  Musculoskeletal:negative except for Follow-up (right foot patient states was working on  and just started hurting on weds 04/10/2019)    Relevant review of systems reviewed and available in the patient's chart in media tab    Vital Signs:  Vitals:    04/12/19 1005   BP: 120/74   Pulse: 78   Weight: 98 lb 12.3 oz (44.8 kg)   Height: 5' 5\" (1.651 m)           General Exam:   Constitutional: Patient is adequately groomed with no evidence of malnutrition  Mental Status: The patient is oriented to time, place and person. The patient's mood and affect are appropriate. Vascular: Examination reveals no swelling or calf tenderness. Peripheral pulses are palpable and 2+. Ankle Examination  Inspection:   No gross deformities noted. mild swelling noted. No erythema or ecchymosis. Skin is intact. Intact sensation to light touch throughout the foot and good pedal pulses. Palpation: He has some tenderness to palpation along the base of the 5th metatarsal and plantar fascia. Tenderness to palpation along the medial malleolus and deltoid, negative Tenderness to palpation along lateral malleolus, negative Tenderness to palpation along ATFL, negative tenderness along the Navicular or [employer] joint. Range of Motion:   Examination of both ankles showing a good range of motion.  He has dorsiflexion to about 10 degrees bilaterally, which increased with knee flexion.      Strength: 5/5 dorsiflexion, plantarflexion, inversion and eversion     Special Tests:    The ankles are stable to anterior drawer and talar tilt test bilaterally, equally.  normal Greens test  negative Peroneal tendon dislocation, squeeze test, and Grossmans test    Skin: There are no rashes, ulcerations or lesions. Gait:  Examination of the gait, showed that the patient walks heel-toe with a non-antalgic gait and no limp.     Reflex: not tested    Additional Examinations:  Left Lower Extremity: Examination of the left lower extremity does not show any tenderness, deformity or injury. Range of motion is unremarkable. There is no gross instability. There are no rashes, ulcerations or lesions. Strength and tone are normal.      LUMBAR SPINE: The skin is warm and dry. There is no swelling, warmth, or erythema. Range of motion is within normal limits. There is no paraspinal or spinous process tenderness. Ipsilateral and contralateral straight leg raising tests are negative. The distal neurovascular exam is grossly intact and symmetric. X-RAYS: 3 views AP, Lateral, mortise of the right foot were obtained and reviewed, they show no periosteal reaction, medullary lesions, or osteopenia. Mild joint space narrowing. No evidence of fracture or dislocation. Assessment :  Right foot metatarsalgia, plantar fasciitis, osteoarthritis    Impression:  Encounter Diagnoses   Name Primary?  Right foot pain Yes    Primary osteoarthritis of right foot        Office Procedures:  Orders Placed This Encounter   Procedures    XR FOOT RIGHT (MIN 3 VIEWS)     Standing Status:   Future     Number of Occurrences:   1     Standing Expiration Date:   4/12/2020    Darco Post-op Shoe Brace     Patient was prescribed a Darco Post-Op Shoe. The right foot will require stabilization / immobilization from this semi-rigid / rigid orthosis to improve their function. The orthosis will assist in protecting the affected area, provide functional support and facilitate healing. Patient was instructed to progress ambulation weight bearing as tolerated in the device.      The patient was educated and fit by a healthcare professional with expert knowledge and specialization in brace application while under

## 2019-04-12 NOTE — CARE COORDINATION
Ambulatory Care Coordination Note  4/12/2019  CM Risk Score: 2  [place] Mortality Risk Score: 21    ACC: Mona Woodruff, RN    Summary Note: ACC spoke with patient for outreach: Denies any need for additional home services. He is eating and feels hungry. He does most of the cooking.     -placed in boot to Right foot due to bone issue-followed by ortho (fall safety discussed)  -concerns with weight loss. 98 lbs now. Using supplements bid not helping (ensure). Provided dial dietician information, high calorie/high protein diet and learning about getting more protein  -patient is interested in dietician referral .  -COPD zone reviewed. Denies any exacerbation.       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     COPD (chronic obstructive pulmonary disease) (HCC)     Emphysema of lung (HCC)     Hyperlipidemia     Osteoarthritis     Pneumonia      Plan      Ongoing care coordination  Prevent further weight loss  Dietary support needed        Care Coordination Interventions    Program Enrollment:  Rising Risk  Referral from Primary Care Provider:  No  Suggested Interventions and Community Resources  Fall Risk Prevention:  Completed  Home Health Services:  Declined  Medication Assistance Program:  Declined  Medi Set or Pill Pack:  Declined  Physical Therapy:  Not Started  Registered Dietician:  Completed (Comment: active with dietican through [place])  Senior Services:  Declined  Social Work:  Declined  Transportation Support:  Declined  Zone Management Tools:  Completed (Comment: copd zone mailed and discussed)         Goals Addressed                 This Visit's Progress       Care Coordination     Nutrition Plan   Worsening     I will monitor weight and increase protein to prevent muscle wasting    Barriers: impairment:  none  Plan for overcoming my barriers: N/A  Confidence:

## 2019-04-12 NOTE — PATIENT INSTRUCTIONS
hungrier. · Do not waste energy eating foods that do not give you much nutrition, such as potato chips, candy bars, and soft drinks. · Drink plenty of fluids. If you have kidney, heart, or liver disease and have to limit fluids, talk with your doctor before you increase the amount of fluids you drink. Where can you learn more? Go to https://chpemargieewleslye.Devtap. org and sign in to your [facility] account. Enter Y560 in the Decision Curve box to learn more about \"High-Calorie and High-Protein Diet: Care Instructions. \"     If you do not have an account, please click on the \"Sign Up Now\" link. Current as of: March 28, 2018  Content Version: 11.9  © 1607-5769 [facility] [employer]. Care instructions adapted under license by [facility] ([facility]). If you have questions about a medical condition or this instruction, always ask your healthcare professional. [place] [address] any warranty or liability for your use of this information.

## 2019-05-20 NOTE — PROGRESS NOTES
Patient was seen for 4 Visits of PT. Initial eval date 2018. Hardik Dumont Patient was not seen for additional visits due to not returning for scheduled f/u appointments. As of 2018., pt MET 1/6 STGs  Remaining goals not met secondary to inability to reassess as patient did not return for f/u visits. Pt has been discharged at this time, with recommendation to continue HEP as prepared. Please see attached treatment note for most recent status. Thank you for your referral of this patient. Ravin Fernando #[id number]      Outpatient Physical Therapy     [] Daily Treatment Note   [] Progress Note   [x] Discharge Note      Date:  2019    Patient Name:  Pool Mena        :  1932    Restrictions/Precautions:      Diagnosis:  S/p left sholder rot cuff repair. 18    Treatment Diagnosis:  Same and shoulder pain    Insurance/Certification information:  humana medi gold    Referring Physician:  DENISHA Nayak. Plan of care signed (Y/N):      Visit# / total visits:  4    Pain level:    0/10 at times    2-3/10 often   8/10 too much stretching. Subjective:  18 reports he is doing a lot better with lifting arm  12/10/18 reports his shoulder is sore with exercises. 18 states he uses a sling after surgery. He  Had to have another surgery when he was out of his sling for stints in his stomach and was not able to start therapy for his shoulder.   Now he has some limited movement , weakness, and some pain in his shoulder.        PATIENT NAME: Tyler Dowling              :        1932  MED REC NO:   [id number]                          [facility]:   [id number]                           ADMIT DATE: 2018  PROVIDER: Ra Reyes MD     DATE OF PROCEDURE:  2018     SERVICE:  Orthopedic Surgery.     SURGEON: Karen Alfaro MD     FIRST ASSISTANT: Joe Clay.     SECOND ASSISTANT: Reyes Noland

## 2019-05-23 ENCOUNTER — CARE COORDINATION (OUTPATIENT)
Dept: CARE COORDINATION | Age: 84
End: 2019-05-23

## 2019-05-23 ASSESSMENT — ENCOUNTER SYMPTOMS: DYSPNEA ASSOCIATED WITH: EXERTION

## 2019-05-23 NOTE — PATIENT INSTRUCTIONS
hungrier. · Do not waste energy eating foods that do not give you much nutrition, such as potato chips, candy bars, and soft drinks. · Drink plenty of fluids. If you have kidney, heart, or liver disease and have to limit fluids, talk with your doctor before you increase the amount of fluids you drink. Where can you learn more? Go to [email or web address] and sign in to your [employer] account. Enter V453 in the Flitto box to learn more about \"High-Calorie and High-Protein Diet: Care Instructions. \"     If you do not have an account, please click on the \"Sign Up Now\" link. Current as of: November 7, 2018  Content Version: 12.0  © 8390-9232 Healthwise, Incorporated. Care instructions adapted under license by [facility] ([facility]). If you have questions about a medical condition or this instruction, always ask your healthcare professional. [address] any warranty or liability for your use of this information.

## 2019-06-12 RX ORDER — TIOTROPIUM BROMIDE AND OLODATEROL 3.124; 2.736 UG/1; UG/1
SPRAY, METERED RESPIRATORY (INHALATION)
Refills: 0 | OUTPATIENT
Start: 2019-06-12

## 2019-06-20 RX ORDER — ATORVASTATIN CALCIUM 80 MG/1
TABLET, FILM COATED ORAL
Qty: 30 TABLET | Refills: 1 | Status: SHIPPED | OUTPATIENT
Start: 2019-06-20 | End: 2019-07-09 | Stop reason: SDUPTHER

## 2019-06-20 NOTE — TELEPHONE ENCOUNTER
Last OV: 1/29/2019  Future Appointments   Date Time Provider Thee Aguirre   1/30/2020 12:30 PM Nicolas Patel, APRN - CNP [facility]

## 2019-06-28 ENCOUNTER — CARE COORDINATION (OUTPATIENT)
Dept: CARE COORDINATION | Age: 84
End: 2019-06-28

## 2019-06-28 NOTE — CARE COORDINATION
Ambulatory Care Coordination Note  2019  CM Risk Score: 1  Cristhian Mortality Risk Score: [unfilled]    ACC: Christi Marin RN     Past Medical History:   Diagnosis Date    Arthritis     CAD (coronary artery disease)     CABG    Cancer (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     Hyperlipidemia     Osteoarthritis     Pneumonia        Summary Note: Spoke with patient. Reports weight 98lb as of 1 month ago. No longer drinking Ensure. His supply . Reports he is eating well. Had homemade vegetable soup for lunch and is having a T-bone steak for dinner. Reports some swallowing difficulty in the mornings that eases throughout the day. Reports a scabbed sore on his neck that isn't healing. Has had it for about a month. Denied pain or bleeding. Plan:    CC outreach 2 weeks  Weight check  High protein diet education  Nutrition assessment  F/u non healing neck lesion        Care Coordination Interventions    Program Enrollment:  Rising Risk  Referral from Primary Care Provider:  No  Suggested Interventions and Community Resources  Fall Risk Prevention:  Completed  Home Health Services:  Declined  Medication Assistance Program:  Declined  Medi Set or Pill Pack:  Declined  Physical Therapy:  Not Started  Registered Dietician:  Completed (Comment: active with stephanie through [place])  Senior Services:  Raymon Pérez Work:  Declined  Transportation Support:  Declined  Zone Management Tools:  Completed (Comment: copd zone mailed and discussed)         Goals Addressed                 This Visit's Progress       Care Coordination     Nutrition Plan        I will monitor weight and increase protein to prevent muscle wasting    Barriers: impairment:  none  Plan for overcoming my barriers: N/A  Confidence: 8/10  Anticipated Goal Completion Date:     Weight still 98 lbs.  No longer drinking Ensure. States what he had is \"outdated\". States he only has difficulty swallowing in the morning and eases during the day. Prior to Admission medications    Medication Sig Start Date End Date Taking? Authorizing Provider   atorvastatin (LIPITOR) 80 MG tablet TAKE 1 TABLET BY MOUTH EVERY DAY 6/20/19   BURAK Husain CNP   nabumetone (RELAFEN) 750 MG tablet Take 1 tablet by mouth 2 times daily 1/29/19   BURAK Serrano CNP   omeprazole (PRILOSEC) 20 MG delayed release capsule Take 20 mg by mouth daily    Historical Provider, MD   Tiotropium Bromide-Olodaterol 2.5-2.5 MCG/ACT AERS Inhale 2 puffs into the lungs daily 1/17/18   Elizabeth Ervin MD   aspirin 81 MG tablet Take 81 mg by mouth daily.     Historical Provider, MD       Future Appointments   Date Time Provider Thee Aguirre   1/30/2020 12:30 PM BURAK Serrano CNP [facility]

## 2019-07-02 ENCOUNTER — APPOINTMENT (OUTPATIENT)
Dept: GENERAL RADIOLOGY | Age: 84
End: 2019-07-02
Payer: MEDICARE

## 2019-07-02 ENCOUNTER — HOSPITAL ENCOUNTER (OUTPATIENT)
Age: 84
Setting detail: OBSERVATION
Discharge: HOME | End: 2019-07-03
Attending: EMERGENCY MEDICINE | Admitting: INTERNAL MEDICINE
Payer: MEDICARE

## 2019-07-02 DIAGNOSIS — R07.9 CHEST PAIN, UNSPECIFIED TYPE: Primary | ICD-10-CM

## 2019-07-02 LAB
A/G RATIO: 1.2 (ref 1.1–2.2)
ALBUMIN SERPL-MCNC: 4.1 G/DL (ref 3.4–5)
ALP BLD-CCNC: 62 U/L (ref 40–129)
ALT SERPL-CCNC: 11 U/L (ref 10–40)
ANION GAP SERPL CALCULATED.3IONS-SCNC: 10 MMOL/L (ref 3–16)
AST SERPL-CCNC: 21 U/L (ref 15–37)
BASOPHILS ABSOLUTE: 0.1 K/UL (ref 0–0.2)
BASOPHILS RELATIVE PERCENT: 0.8 %
BILIRUB SERPL-MCNC: <0.2 MG/DL (ref 0–1)
BUN BLDV-MCNC: 14 MG/DL (ref 7–20)
CALCIUM SERPL-MCNC: 9.3 MG/DL (ref 8.3–10.6)
CHLORIDE BLD-SCNC: 100 MMOL/L (ref 99–110)
CO2: 28 MMOL/L (ref 21–32)
CREAT SERPL-MCNC: 0.8 MG/DL (ref 0.8–1.3)
EOSINOPHILS ABSOLUTE: 0.8 K/UL (ref 0–0.6)
EOSINOPHILS RELATIVE PERCENT: 8.5 %
GFR AFRICAN AMERICAN: >60
GFR NON-AFRICAN AMERICAN: >60
GLOBULIN: 3.3 G/DL
GLUCOSE BLD-MCNC: 104 MG/DL (ref 70–99)
HCT VFR BLD CALC: 42.3 % (ref 40.5–52.5)
HEMOGLOBIN: 14.3 G/DL (ref 13.5–17.5)
INR BLD: 1.1 (ref 0.86–1.14)
LYMPHOCYTES ABSOLUTE: 1.8 K/UL (ref 1–5.1)
LYMPHOCYTES RELATIVE PERCENT: 20.2 %
MCH RBC QN AUTO: 30.8 PG (ref 26–34)
MCHC RBC AUTO-ENTMCNC: 33.9 G/DL (ref 31–36)
MCV RBC AUTO: 90.9 FL (ref 80–100)
MONOCYTES ABSOLUTE: 0.7 K/UL (ref 0–1.3)
MONOCYTES RELATIVE PERCENT: 8.3 %
NEUTROPHILS ABSOLUTE: 5.5 K/UL (ref 1.7–7.7)
NEUTROPHILS RELATIVE PERCENT: 62.2 %
PDW BLD-RTO: 14.3 % (ref 12.4–15.4)
PLATELET # BLD: 238 K/UL (ref 135–450)
PMV BLD AUTO: 8 FL (ref 5–10.5)
POTASSIUM SERPL-SCNC: 4.6 MMOL/L (ref 3.5–5.1)
PRO-BNP: 480 PG/ML (ref 0–449)
PROTHROMBIN TIME: 12.5 SEC (ref 9.8–13)
RBC # BLD: 4.66 M/UL (ref 4.2–5.9)
SODIUM BLD-SCNC: 138 MMOL/L (ref 136–145)
TOTAL PROTEIN: 7.4 G/DL (ref 6.4–8.2)
TROPONIN: <0.01 NG/ML
TROPONIN: <0.01 NG/ML
WBC # BLD: 8.9 K/UL (ref 4–11)

## 2019-07-02 PROCEDURE — G0378 HOSPITAL OBSERVATION PER HR: HCPCS

## 2019-07-02 PROCEDURE — 85025 COMPLETE CBC W/AUTO DIFF WBC: CPT

## 2019-07-02 PROCEDURE — 2580000003 HC RX 258: Performed by: INTERNAL MEDICINE

## 2019-07-02 PROCEDURE — 71046 X-RAY EXAM CHEST 2 VIEWS: CPT

## 2019-07-02 PROCEDURE — 6370000000 HC RX 637 (ALT 250 FOR IP): Performed by: INTERNAL MEDICINE

## 2019-07-02 PROCEDURE — 94640 AIRWAY INHALATION TREATMENT: CPT

## 2019-07-02 PROCEDURE — 2700000000 HC OXYGEN THERAPY PER DAY

## 2019-07-02 PROCEDURE — 93005 ELECTROCARDIOGRAM TRACING: CPT | Performed by: EMERGENCY MEDICINE

## 2019-07-02 PROCEDURE — 94761 N-INVAS EAR/PLS OXIMETRY MLT: CPT

## 2019-07-02 PROCEDURE — 84484 ASSAY OF TROPONIN QUANT: CPT

## 2019-07-02 PROCEDURE — 36415 COLL VENOUS BLD VENIPUNCTURE: CPT

## 2019-07-02 PROCEDURE — 83880 ASSAY OF NATRIURETIC PEPTIDE: CPT

## 2019-07-02 PROCEDURE — 85610 PROTHROMBIN TIME: CPT

## 2019-07-02 PROCEDURE — 80053 COMPREHEN METABOLIC PANEL: CPT

## 2019-07-02 PROCEDURE — 99203 OFFICE O/P NEW LOW 30 MIN: CPT | Performed by: INTERNAL MEDICINE

## 2019-07-02 PROCEDURE — 99285 EMERGENCY DEPT VISIT HI MDM: CPT

## 2019-07-02 RX ORDER — ASPIRIN 325 MG
325 TABLET ORAL ONCE
Status: DISCONTINUED | OUTPATIENT
Start: 2019-07-02 | End: 2019-07-03 | Stop reason: HOSPADM

## 2019-07-02 RX ORDER — SODIUM CHLORIDE 0.9 % (FLUSH) 0.9 %
10 SYRINGE (ML) INJECTION EVERY 12 HOURS SCHEDULED
Status: DISCONTINUED | OUTPATIENT
Start: 2019-07-02 | End: 2019-07-03 | Stop reason: HOSPADM

## 2019-07-02 RX ORDER — ACETAMINOPHEN 325 MG/1
650 TABLET ORAL EVERY 4 HOURS PRN
Status: DISCONTINUED | OUTPATIENT
Start: 2019-07-02 | End: 2019-07-03 | Stop reason: HOSPADM

## 2019-07-02 RX ORDER — ATORVASTATIN CALCIUM 80 MG/1
80 TABLET, FILM COATED ORAL DAILY
Status: DISCONTINUED | OUTPATIENT
Start: 2019-07-02 | End: 2019-07-03 | Stop reason: HOSPADM

## 2019-07-02 RX ORDER — ONDANSETRON 2 MG/ML
4 INJECTION INTRAMUSCULAR; INTRAVENOUS EVERY 4 HOURS PRN
Status: DISCONTINUED | OUTPATIENT
Start: 2019-07-02 | End: 2019-07-03 | Stop reason: HOSPADM

## 2019-07-02 RX ORDER — ASPIRIN 81 MG/1
81 TABLET ORAL DAILY
Status: DISCONTINUED | OUTPATIENT
Start: 2019-07-02 | End: 2019-07-03 | Stop reason: HOSPADM

## 2019-07-02 RX ORDER — PANTOPRAZOLE SODIUM 20 MG/1
20 TABLET, DELAYED RELEASE ORAL DAILY
COMMUNITY
End: 2020-01-01 | Stop reason: ALTCHOICE

## 2019-07-02 RX ORDER — SODIUM CHLORIDE 0.9 % (FLUSH) 0.9 %
10 SYRINGE (ML) INJECTION PRN
Status: DISCONTINUED | OUTPATIENT
Start: 2019-07-02 | End: 2019-07-03 | Stop reason: HOSPADM

## 2019-07-02 RX ORDER — PANTOPRAZOLE SODIUM 20 MG/1
20 TABLET, DELAYED RELEASE ORAL DAILY
Status: DISCONTINUED | OUTPATIENT
Start: 2019-07-02 | End: 2019-07-03 | Stop reason: HOSPADM

## 2019-07-02 RX ADMIN — ASPIRIN 81 MG: 81 TABLET ORAL at 22:09

## 2019-07-02 RX ADMIN — GLYCOPYRROLATE AND FORMOTEROL FUMARATE 2 PUFF: 9; 4.8 AEROSOL, METERED RESPIRATORY (INHALATION) at 19:39

## 2019-07-02 RX ADMIN — ATORVASTATIN CALCIUM 80 MG: 80 TABLET, FILM COATED ORAL at 22:09

## 2019-07-02 RX ADMIN — Medication 10 ML: at 22:09

## 2019-07-02 ASSESSMENT — PAIN SCALES - GENERAL
PAINLEVEL_OUTOF10: 0
PAINLEVEL_OUTOF10: 2
PAINLEVEL_OUTOF10: 0

## 2019-07-02 NOTE — H&P
Normal external eye, conjunctiva, lids cornea, CHI. Ears: Normal external ears. Non-tender. Nose: Normal external nose, mucus membranes and septum. Pharynx: Dental Hygiene adequate. Normal buccal mucosa. Normal pharynx. Neck: no adenopathy, no carotid bruit, no JVD, supple, symmetrical, trachea midline and thyroid not enlarged, symmetric, no tenderness/mass/nodules  Lungs: clear to auscultation bilaterally and no use of accessory muscles. Heart: regular rate and rhythm, S1, S2 normal, no murmur, click, rub or gallop and normal apical impulse  Abdomen: soft, non-tender; bowel sounds normal; no masses,  no organomegaly  Extremities: extremities atraumatic, no cyanosis or edema and Homans sign is negative, no sign of DVT. Capillary Refill: Acceptable < 3 seconds  Peripheral Pulses: +3 easily felt, not easily obliterated with pressures  Skin: Skin color, texture, turgor normal. No rashes or lesions on exposed skin  Neurologic: Neurovascularly intact without any focal sensory/motor deficits. Cranial nerves: II-XII intact, grossly non-focal. Gait was not tested. Psychiatric: Alert and oriented, thought content appropriate, normal insight    CBC:   Recent Labs     07/02/19  1133   WBC 8.9   HGB 14.3        BMP:    Recent Labs     07/02/19  1133      K 4.6      CO2 28   BUN 14   CREATININE 0.8   GLUCOSE 104*     Troponin:   Recent Labs     07/02/19  1133   TROPONINI <0.01     PT/INR:  No results found for: PTINR  U/A:  No results found for: LEUKOCYTESUR, NITRITE, RBCUA, SPECGRAV, [address], BILIRUBINUR, BLOODU, [place] Mortimer      RAD:   I have independently reviewed and interpreted the imaging studies below and based my recommendations to the patient on those findings. Xr Chest Standard (2 Vw)    Result Date: 7/2/2019  EXAMINATION: TWO XRAY VIEWS OF THE CHEST 7/2/2019 11:35 am COMPARISON: None.  HISTORY: ORDERING SYSTEM PROVIDED HISTORY: chest pain TECHNOLOGIST PROVIDED HISTORY: Reason

## 2019-07-02 NOTE — CONSULTS
tenderness/mass/nodules, no carotid bruit or JVD       Lungs:   Clear to auscultation bilaterally, respirations unlabored   Chest Wall:  No tenderness or deformity   Heart:  Regular rhythm and normal rate; S1, S2 are normal; no murmur noted; no rub or gallop   Abdomen:   Soft, non-tender, bowel sounds active all four quadrants,  no masses, no organomegaly           Extremities: Extremities normal, atraumatic, no cyanosis or edema   Pulses: 2+ and symmetric   Skin: Skin color, texture, turgor normal, no rashes or lesions   Pysch: Normal mood and affect   Neurologic: Normal gross motor and sensory exam.         Labs  Recent Labs     07/02/19  1133   WBC 8.9   HGB 14.3   HCT 42.3   MCV 90.9        Recent Labs     07/02/19  1133   CREATININE 0.8   BUN 14      K 4.6      CO2 28     Recent Labs     07/02/19  1133   INR 1.10   PROTIME 12.5     Recent Labs     07/02/19  1133 07/02/19  1434   TROPONINI <0.01 <0.01     Invalid input(s): PRO-BNP  No results for input(s): CHOL, HDL in the last 72 hours. Invalid input(s): LDL, TG      Imaging:  I have reviewed the below testing personally and my interpretation is below. EKG:Normal sinus rhythmRight bundle branch blockLeft posterior fascicular block Bifascicular block Abnormal ECGWhen compared with ECG of 04-SEP-2018 06:56,Left posterior fascicular block is now PresentT wave inversion no longer evident in Inferior leads  CXR:      Assessment:  80 y.o. patient with:  Problem List Items Addressed This Visit     * (Principal) Chest pain - Primary          Plan:  1. 1. The patient's symptoms and associated risk factors suggest an intermediate probability of ischemic heart disease as the cause for the symptoms. I recommend the patient undergo non-invasive evaluation with stress testing to further evaluate the symptoms. 2. Serial troponin levels will be ordered and reviewed  3.  The patient has been given instructions on addressing diet, regular exercise, weight

## 2019-07-03 ENCOUNTER — APPOINTMENT (OUTPATIENT)
Dept: NUCLEAR MEDICINE | Age: 84
End: 2019-07-03
Payer: MEDICARE

## 2019-07-03 VITALS
WEIGHT: 97.3 LBS | HEIGHT: 65 IN | BODY MASS INDEX: 16.21 KG/M2 | SYSTOLIC BLOOD PRESSURE: 112 MMHG | DIASTOLIC BLOOD PRESSURE: 59 MMHG | RESPIRATION RATE: 16 BRPM | TEMPERATURE: 97.7 F | OXYGEN SATURATION: 91 % | HEART RATE: 64 BPM

## 2019-07-03 LAB
ANION GAP SERPL CALCULATED.3IONS-SCNC: 9 MMOL/L (ref 3–16)
BASOPHILS ABSOLUTE: 0.1 K/UL (ref 0–0.2)
BASOPHILS RELATIVE PERCENT: 0.9 %
BUN BLDV-MCNC: 17 MG/DL (ref 7–20)
CALCIUM SERPL-MCNC: 9 MG/DL (ref 8.3–10.6)
CHLORIDE BLD-SCNC: 103 MMOL/L (ref 99–110)
CO2: 27 MMOL/L (ref 21–32)
CREAT SERPL-MCNC: 0.7 MG/DL (ref 0.8–1.3)
EKG ATRIAL RATE: 68 BPM
EKG DIAGNOSIS: NORMAL
EKG P AXIS: 83 DEGREES
EKG P-R INTERVAL: 132 MS
EKG Q-T INTERVAL: 410 MS
EKG QRS DURATION: 134 MS
EKG QTC CALCULATION (BAZETT): 435 MS
EKG R AXIS: 136 DEGREES
EKG T AXIS: 56 DEGREES
EKG VENTRICULAR RATE: 68 BPM
EOSINOPHILS ABSOLUTE: 1.3 K/UL (ref 0–0.6)
EOSINOPHILS RELATIVE PERCENT: 13.9 %
GFR AFRICAN AMERICAN: >60
GFR NON-AFRICAN AMERICAN: >60
GLUCOSE BLD-MCNC: 89 MG/DL (ref 70–99)
HCT VFR BLD CALC: 41.6 % (ref 40.5–52.5)
HEMOGLOBIN: 13.9 G/DL (ref 13.5–17.5)
LV EF: 40 %
LV EF: 50 %
LVEF MODALITY: NORMAL
LVEF MODALITY: NORMAL
LYMPHOCYTES ABSOLUTE: 2.4 K/UL (ref 1–5.1)
LYMPHOCYTES RELATIVE PERCENT: 24.9 %
MCH RBC QN AUTO: 30.5 PG (ref 26–34)
MCHC RBC AUTO-ENTMCNC: 33.5 G/DL (ref 31–36)
MCV RBC AUTO: 91.1 FL (ref 80–100)
MONOCYTES ABSOLUTE: 0.8 K/UL (ref 0–1.3)
MONOCYTES RELATIVE PERCENT: 8.8 %
NEUTROPHILS ABSOLUTE: 5 K/UL (ref 1.7–7.7)
NEUTROPHILS RELATIVE PERCENT: 51.5 %
PDW BLD-RTO: 14.1 % (ref 12.4–15.4)
PLATELET # BLD: 240 K/UL (ref 135–450)
PMV BLD AUTO: 8.2 FL (ref 5–10.5)
POTASSIUM REFLEX MAGNESIUM: 4.5 MMOL/L (ref 3.5–5.1)
RBC # BLD: 4.57 M/UL (ref 4.2–5.9)
SODIUM BLD-SCNC: 139 MMOL/L (ref 136–145)
WBC # BLD: 9.7 K/UL (ref 4–11)

## 2019-07-03 PROCEDURE — G0378 HOSPITAL OBSERVATION PER HR: HCPCS

## 2019-07-03 PROCEDURE — 94640 AIRWAY INHALATION TREATMENT: CPT

## 2019-07-03 PROCEDURE — 80048 BASIC METABOLIC PNL TOTAL CA: CPT

## 2019-07-03 PROCEDURE — 6360000004 HC RX CONTRAST MEDICATION: Performed by: INTERNAL MEDICINE

## 2019-07-03 PROCEDURE — 3430000000 HC RX DIAGNOSTIC RADIOPHARMACEUTICAL: Performed by: INTERNAL MEDICINE

## 2019-07-03 PROCEDURE — 78452 HT MUSCLE IMAGE SPECT MULT: CPT

## 2019-07-03 PROCEDURE — 36415 COLL VENOUS BLD VENIPUNCTURE: CPT

## 2019-07-03 PROCEDURE — 85025 COMPLETE CBC W/AUTO DIFF WBC: CPT

## 2019-07-03 PROCEDURE — 2580000003 HC RX 258: Performed by: INTERNAL MEDICINE

## 2019-07-03 PROCEDURE — A9502 TC99M TETROFOSMIN: HCPCS | Performed by: INTERNAL MEDICINE

## 2019-07-03 PROCEDURE — 6360000002 HC RX W HCPCS: Performed by: INTERNAL MEDICINE

## 2019-07-03 PROCEDURE — 93010 ELECTROCARDIOGRAM REPORT: CPT | Performed by: INTERNAL MEDICINE

## 2019-07-03 PROCEDURE — 6370000000 HC RX 637 (ALT 250 FOR IP): Performed by: INTERNAL MEDICINE

## 2019-07-03 PROCEDURE — 99226 PR SBSQ OBSERVATION CARE/DAY 35 MINUTES: CPT | Performed by: INTERNAL MEDICINE

## 2019-07-03 PROCEDURE — C8929 TTE W OR WO FOL WCON,DOPPLER: HCPCS

## 2019-07-03 PROCEDURE — 93017 CV STRESS TEST TRACING ONLY: CPT

## 2019-07-03 RX ADMIN — TETROFOSMIN 9.9 MILLICURIE: 1.38 INJECTION, POWDER, LYOPHILIZED, FOR SOLUTION INTRAVENOUS at 09:01

## 2019-07-03 RX ADMIN — PANTOPRAZOLE SODIUM 20 MG: 20 TABLET, DELAYED RELEASE ORAL at 13:56

## 2019-07-03 RX ADMIN — PERFLUTREN 1.65 MG: 6.52 INJECTION, SUSPENSION INTRAVENOUS at 09:21

## 2019-07-03 RX ADMIN — TETROFOSMIN 31.1 MILLICURIE: 1.38 INJECTION, POWDER, LYOPHILIZED, FOR SOLUTION INTRAVENOUS at 10:52

## 2019-07-03 RX ADMIN — PERFLUTREN 2.2 MG: 6.52 INJECTION, SUSPENSION INTRAVENOUS at 09:25

## 2019-07-03 RX ADMIN — Medication 10 ML: at 13:56

## 2019-07-03 RX ADMIN — ASPIRIN 81 MG: 81 TABLET ORAL at 13:56

## 2019-07-03 RX ADMIN — REGADENOSON 0.4 MG: 0.08 INJECTION, SOLUTION INTRAVENOUS at 10:52

## 2019-07-03 RX ADMIN — GLYCOPYRROLATE AND FORMOTEROL FUMARATE 2 PUFF: 9; 4.8 AEROSOL, METERED RESPIRATORY (INHALATION) at 08:21

## 2019-07-03 ASSESSMENT — PAIN SCALES - GENERAL
PAINLEVEL_OUTOF10: 0

## 2019-07-03 NOTE — PROGRESS NOTES
[facility] notified of patient transferring off unit for stress test. Will continue to monitor and notify when patient returns to assigned room.

## 2019-07-03 NOTE — PROGRESS NOTES
[address] [facility]   Cardiovascular Evaluation    PATIENT: Jimenez Hebert  DATE: 7/3/2019  MRN: [id number]  CSN: [id number]  : 1932    Primary Care Doctor/Referring provider: Cem Santoyo MD    Reason for evaluation/Chief complaint:   Chest Pain (started yesterday)    Subjective: No further chest pain. History of present illness on initial date of evaluation:   Jimenez Hebert is a 80 y.o. patient who presents with complaints chest pain. The patient states that they have had a severe pain located middle of their chest. The pain started days ago and has been since onset. The pain did not radiate to the left arm or jaw area. The pain is not associated with exertion. The pain resolved within 1 minute and resolved without intervention. The pain was not associated with diaphoresis or co-existent SOB. The patient was and evaluated in the ER. They have been admitted for further evaluation. Overnight, the patient's EKG and troponin levels have not been concerning. 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 Surgical History:   has a past surgical history that includes Coronary artery bypass graft (); Spine surgery ();  Knee arthroscopy (Bilateral); eye surgery (Bilateral); Cardiac surgery; Endoscopy, colon, diagnostic; Endoscopy, colon, diagnostic (12 Oct 2015); Diagnostic Cardiac Cath Lab Procedure; Colonoscopy; Colonoscopy (12 IOct 2015); Upper gastrointestinal endoscopy (10/12/2015); pre-malignant / benign skin lesion excision (Right); hernia repair; Shoulder arthroscopy (Right); other surgical history; pr repair rotator cuff,acute (Left, 9/4/2018); and shoulder surgery (Left). Social History:   reports that he has been smoking cigarettes. He has a 66.00 pack-year smoking history. He has never used smokeless tobacco. He reports that he does not drink alcohol or use drugs. Family History:  No evidence for sudden cardiac death or premature CAD    Medications:  Reviewed and are listed in nursing record. and/or listed below  Outpatient Medications:  Prior to Admission medications    Medication Sig Start Date End Date Taking? Authorizing Provider   pantoprazole (PROTONIX) 20 MG tablet Take 20 mg by mouth daily   Yes Historical Provider, MD   atorvastatin (LIPITOR) 80 MG tablet TAKE 1 TABLET BY MOUTH EVERY DAY 6/20/19  Yes BURAK Henry CNP   Tiotropium Bromide-Olodaterol 2.5-2.5 MCG/ACT AERS Inhale 2 puffs into the lungs daily 1/17/18  Yes Kevin Roe MD   aspirin 81 MG tablet Take 81 mg by mouth daily. Yes Historical Provider, MD       In-patient schedule medications:   aspirin  325 mg Oral Once    nitroglycerin  0.5 inch Topical Once    glycopyrrolate-formoterol  2 puff Inhalation BID    pantoprazole  20 mg Oral Daily    atorvastatin  80 mg Oral Daily    aspirin  81 mg Oral Daily    sodium chloride flush  10 mL Intravenous 2 times per day    enoxaparin  40 mg Subcutaneous Daily         Infusion Medications: Allergies:  Patient has no known allergies. Review of Systems:   All 14 point review of symptoms completed. Pertinent positives identified in the HPI, all other review of symptoms findings as below.

## 2019-07-03 NOTE — CARE COORDINATION
7/3/19 Pt sleeping unable to wake pt. Per RN pt lives at home with his wife independently and isn't likely to have any needs. Chart reviewed and no needs anticipated. Please notify CM should any needs arise.

## 2019-07-05 ENCOUNTER — TELEPHONE (OUTPATIENT)
Dept: INTERNAL MEDICINE CLINIC | Age: 84
End: 2019-07-05

## 2019-07-09 ENCOUNTER — OFFICE VISIT (OUTPATIENT)
Dept: INTERNAL MEDICINE CLINIC | Age: 84
End: 2019-07-09
Payer: MEDICARE

## 2019-07-09 VITALS
DIASTOLIC BLOOD PRESSURE: 60 MMHG | BODY MASS INDEX: 15.31 KG/M2 | HEART RATE: 68 BPM | OXYGEN SATURATION: 98 % | WEIGHT: 92 LBS | SYSTOLIC BLOOD PRESSURE: 116 MMHG

## 2019-07-09 DIAGNOSIS — J44.9 CHRONIC OBSTRUCTIVE PULMONARY DISEASE, UNSPECIFIED COPD TYPE (HCC): ICD-10-CM

## 2019-07-09 DIAGNOSIS — R07.9 CHEST PAIN, UNSPECIFIED TYPE: Primary | ICD-10-CM

## 2019-07-09 PROCEDURE — 4040F PNEUMOC VAC/ADMIN/RCVD: CPT | Performed by: NURSE PRACTITIONER

## 2019-07-09 PROCEDURE — 4004F PT TOBACCO SCREEN RCVD TLK: CPT | Performed by: NURSE PRACTITIONER

## 2019-07-09 PROCEDURE — G8427 DOCREV CUR MEDS BY ELIG CLIN: HCPCS | Performed by: NURSE PRACTITIONER

## 2019-07-09 PROCEDURE — G8419 CALC BMI OUT NRM PARAM NOF/U: HCPCS | Performed by: NURSE PRACTITIONER

## 2019-07-09 PROCEDURE — 1123F ACP DISCUSS/DSCN MKR DOCD: CPT | Performed by: NURSE PRACTITIONER

## 2019-07-09 PROCEDURE — 3023F SPIROM DOC REV: CPT | Performed by: NURSE PRACTITIONER

## 2019-07-09 PROCEDURE — 99214 OFFICE O/P EST MOD 30 MIN: CPT | Performed by: NURSE PRACTITIONER

## 2019-07-09 PROCEDURE — G8926 SPIRO NO PERF OR DOC: HCPCS | Performed by: NURSE PRACTITIONER

## 2019-07-09 PROCEDURE — G8598 ASA/ANTIPLAT THER USED: HCPCS | Performed by: NURSE PRACTITIONER

## 2019-07-09 PROCEDURE — 1111F DSCHRG MED/CURRENT MED MERGE: CPT | Performed by: NURSE PRACTITIONER

## 2019-07-09 RX ORDER — ATORVASTATIN CALCIUM 80 MG/1
TABLET, FILM COATED ORAL
Qty: 30 TABLET | Refills: 2 | Status: SHIPPED | OUTPATIENT
Start: 2019-07-09 | End: 2020-03-11 | Stop reason: SDUPTHER

## 2019-07-09 ASSESSMENT — ENCOUNTER SYMPTOMS
CHEST TIGHTNESS: 0
SINUS PAIN: 0
DIARRHEA: 0
SORE THROAT: 0
CONSTIPATION: 0
NAUSEA: 0
SHORTNESS OF BREATH: 1
VOMITING: 0
COUGH: 0

## 2019-07-09 NOTE — PROGRESS NOTES
Post-Discharge Transitional Care Management Services or Hospital Follow Up      Jimenez Edgar   YOB: 1932    Date of Office Visit:  7/9/2019  Date of Hospital Admission: 7/2/19  Date of Hospital Discharge: 7/3/19  Readmission Risk Score(high >=14%. Medium >=10%):Readmission Risk Score: 0    Care management risk score Rising risk (score 2-5) and Complex Care (Scores >=6): 1     Non face to face  following discharge, date last encounter closed (first attempt may have been earlier): 7/5/2019  8:41 AM 7/5/2019  8:41 AM    Call initiated 2 business days of discharge: Yes     Patient Active Problem List   Diagnosis    Hyperlipidemia    COPD (chronic obstructive pulmonary disease) ([address]    CAD (coronary artery disease)    Pneumonia    Weight loss    Hernia    Shoulder pain    Chest pain    COPD with acute exacerbation ([address]    Tobacco dependence    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              atorvastatin (LIPITOR) 80 MG tablet  TAKE 1 TABLET BY MOUTH EVERY DAY             pantoprazole (PROTONIX) 20 MG tablet  Take 20 mg by mouth daily             Tiotropium Bromide-Olodaterol 2.5-2.5 MCG/ACT AERS  Inhale 2 puffs into the lungs daily                 Medications marked \"taking\" at this time  Outpatient Medications Marked as Taking for the 7/9/19 encounter (Office Visit) with BURAK Carreno CNP   Medication Sig Dispense Refill    Tiotropium Bromide-Olodaterol 2.5-2.5 MCG/ACT AERS Inhale 2 puffs into the lungs daily 1 Inhaler 5    atorvastatin (LIPITOR) 80 MG tablet TAKE 1 TABLET BY MOUTH EVERY DAY 30 tablet 2    pantoprazole (PROTONIX) 20 MG tablet Take 20 complexity

## 2019-08-12 ENCOUNTER — OFFICE VISIT (OUTPATIENT)
Dept: INTERNAL MEDICINE CLINIC | Age: 84
End: 2019-08-12
Payer: MEDICARE

## 2019-08-12 VITALS
BODY MASS INDEX: 16.08 KG/M2 | DIASTOLIC BLOOD PRESSURE: 64 MMHG | WEIGHT: 96.6 LBS | SYSTOLIC BLOOD PRESSURE: 104 MMHG | HEART RATE: 64 BPM | OXYGEN SATURATION: 95 %

## 2019-08-12 DIAGNOSIS — R19.5 RED STOOL: ICD-10-CM

## 2019-08-12 DIAGNOSIS — R63.4 WEIGHT LOSS, UNINTENTIONAL: ICD-10-CM

## 2019-08-12 DIAGNOSIS — J44.9 CHRONIC OBSTRUCTIVE PULMONARY DISEASE, UNSPECIFIED COPD TYPE (HCC): Primary | ICD-10-CM

## 2019-08-12 PROCEDURE — 4004F PT TOBACCO SCREEN RCVD TLK: CPT | Performed by: NURSE PRACTITIONER

## 2019-08-12 PROCEDURE — G8419 CALC BMI OUT NRM PARAM NOF/U: HCPCS | Performed by: NURSE PRACTITIONER

## 2019-08-12 PROCEDURE — G8598 ASA/ANTIPLAT THER USED: HCPCS | Performed by: NURSE PRACTITIONER

## 2019-08-12 PROCEDURE — G8427 DOCREV CUR MEDS BY ELIG CLIN: HCPCS | Performed by: NURSE PRACTITIONER

## 2019-08-12 PROCEDURE — 99213 OFFICE O/P EST LOW 20 MIN: CPT | Performed by: NURSE PRACTITIONER

## 2019-08-12 PROCEDURE — G8926 SPIRO NO PERF OR DOC: HCPCS | Performed by: NURSE PRACTITIONER

## 2019-08-12 PROCEDURE — 1123F ACP DISCUSS/DSCN MKR DOCD: CPT | Performed by: NURSE PRACTITIONER

## 2019-08-12 PROCEDURE — 3023F SPIROM DOC REV: CPT | Performed by: NURSE PRACTITIONER

## 2019-08-12 PROCEDURE — 4040F PNEUMOC VAC/ADMIN/RCVD: CPT | Performed by: NURSE PRACTITIONER

## 2019-08-12 ASSESSMENT — ENCOUNTER SYMPTOMS
CONSTIPATION: 0
SHORTNESS OF BREATH: 0
SINUS PAIN: 0
CHEST TIGHTNESS: 0
COUGH: 0
SORE THROAT: 0
DIARRHEA: 0
VOMITING: 0
NAUSEA: 0

## 2019-08-12 NOTE — PROGRESS NOTES
are equal, round, and reactive to light. Lids are normal.   Neck: Normal range of motion. Cardiovascular: Normal rate, regular rhythm, S1 normal, S2 normal, normal heart sounds and normal pulses. No extrasystoles are present. PMI is not displaced. Exam reveals no gallop, no S3, no S4, no distant heart sounds and no friction rub. No murmur heard. No systolic murmur is present. No diastolic murmur is present. Pulmonary/Chest: Effort normal and breath sounds normal. No accessory muscle usage. No respiratory distress. He has no decreased breath sounds. He has no wheezes. He has no rhonchi. He has no rales. Abdominal: Soft. Normal appearance and bowel sounds are normal.   Neurological: He is alert. Skin: Skin is warm, dry and intact. Psychiatric: He has a normal mood and affect. His speech is normal.   Nursing note and vitals reviewed. Assessment & Plan: The following diagnoses and conditions are stable with no further orders unlessindicated:    1. Chronic obstructive pulmonary disease, unspecified COPD type (HCC)    2. Red stool    3. Weight loss, unintentional      Marcina Puls was seen today for hyperlipidemia and copd. Diagnoses and all orders for this visit:    Chronic obstructive pulmonary disease, unspecified COPD type ([facility]    Symptoms improved. Continue current regimen. Red stool  -     POCT Fecal Immunochemical Test (FIT); Future    Weight loss, unintentional    Slight gain since last appointment. Encouraged him to add Boost with every meal for even more calorie intake. No anemias noted on previous CBC to hint at cancerous etiology. Monitor for the time being. Return in about 3 months (around 11/12/2019). Patient should call the office immediately with new or ongoing signs or symptoms or worsening, or proceed to the emergency room. If you are onmedications which could impair your senses, you are at risk of weakness, falls, dizziness, or drowsiness.   You should be careful during

## 2019-08-16 DIAGNOSIS — R19.5 RED STOOL: ICD-10-CM

## 2019-08-16 LAB
CONTROL: NORMAL
HEMOCCULT STL QL: NEGATIVE

## 2019-08-16 PROCEDURE — 82274 ASSAY TEST FOR BLOOD FECAL: CPT | Performed by: NURSE PRACTITIONER

## 2019-09-18 ENCOUNTER — CARE COORDINATION (OUTPATIENT)
Dept: CARE COORDINATION | Age: 84
End: 2019-09-18

## 2019-10-16 ENCOUNTER — NURSE ONLY (OUTPATIENT)
Dept: INTERNAL MEDICINE CLINIC | Age: 84
End: 2019-10-16
Payer: MEDICARE

## 2019-10-16 DIAGNOSIS — Z23 NEED FOR IMMUNIZATION AGAINST INFLUENZA: Primary | ICD-10-CM

## 2019-10-16 PROCEDURE — G0008 ADMIN INFLUENZA VIRUS VAC: HCPCS | Performed by: INTERNAL MEDICINE

## 2019-10-16 PROCEDURE — 90653 IIV ADJUVANT VACCINE IM: CPT | Performed by: INTERNAL MEDICINE

## 2019-12-25 ENCOUNTER — HOSPITAL ENCOUNTER (EMERGENCY)
Age: 84
Discharge: HOME | End: 2019-12-25
Attending: EMERGENCY MEDICINE
Payer: MEDICARE

## 2019-12-25 ENCOUNTER — APPOINTMENT (OUTPATIENT)
Dept: GENERAL RADIOLOGY | Age: 84
End: 2019-12-25
Payer: MEDICARE

## 2019-12-25 VITALS
OXYGEN SATURATION: 95 % | TEMPERATURE: 97.2 F | HEART RATE: 78 BPM | RESPIRATION RATE: 17 BRPM | HEIGHT: 65 IN | SYSTOLIC BLOOD PRESSURE: 135 MMHG | WEIGHT: 90 LBS | DIASTOLIC BLOOD PRESSURE: 56 MMHG | BODY MASS INDEX: 14.99 KG/M2

## 2019-12-25 DIAGNOSIS — J40 BRONCHITIS: Primary | ICD-10-CM

## 2019-12-25 DIAGNOSIS — R05.9 COUGH: ICD-10-CM

## 2019-12-25 LAB
ANION GAP SERPL CALCULATED.3IONS-SCNC: 10 MMOL/L (ref 3–16)
BASE EXCESS VENOUS: 1 MMOL/L (ref -3–3)
BASOPHILS ABSOLUTE: 0 K/UL (ref 0–0.2)
BASOPHILS RELATIVE PERCENT: 0.8 %
BUN BLDV-MCNC: 15 MG/DL (ref 7–20)
CALCIUM SERPL-MCNC: 8.7 MG/DL (ref 8.3–10.6)
CARBOXYHEMOGLOBIN: 6.3 % (ref 0–1.5)
CHLORIDE BLD-SCNC: 97 MMOL/L (ref 99–110)
CO2: 27 MMOL/L (ref 21–32)
CREAT SERPL-MCNC: 0.8 MG/DL (ref 0.8–1.3)
EOSINOPHILS ABSOLUTE: 0.3 K/UL (ref 0–0.6)
EOSINOPHILS RELATIVE PERCENT: 7.5 %
GFR AFRICAN AMERICAN: >60
GFR NON-AFRICAN AMERICAN: >60
GLUCOSE BLD-MCNC: 79 MG/DL (ref 70–99)
HCO3 VENOUS: 27.7 MMOL/L (ref 23–29)
HCT VFR BLD CALC: 42.3 % (ref 40.5–52.5)
HEMOGLOBIN: 14.2 G/DL (ref 13.5–17.5)
LYMPHOCYTES ABSOLUTE: 0.9 K/UL (ref 1–5.1)
LYMPHOCYTES RELATIVE PERCENT: 19.2 %
MCH RBC QN AUTO: 30.7 PG (ref 26–34)
MCHC RBC AUTO-ENTMCNC: 33.7 G/DL (ref 31–36)
MCV RBC AUTO: 91.2 FL (ref 80–100)
METHEMOGLOBIN VENOUS: 0.2 %
MONOCYTES ABSOLUTE: 0.8 K/UL (ref 0–1.3)
MONOCYTES RELATIVE PERCENT: 17.1 %
NEUTROPHILS ABSOLUTE: 2.5 K/UL (ref 1.7–7.7)
NEUTROPHILS RELATIVE PERCENT: 55.4 %
O2 CONTENT, VEN: 14 VOL %
O2 SAT, VEN: 70 %
O2 THERAPY: ABNORMAL
PCO2, VEN: 52 MMHG (ref 40–50)
PDW BLD-RTO: 13.9 % (ref 12.4–15.4)
PH VENOUS: 7.34 (ref 7.35–7.45)
PLATELET # BLD: 186 K/UL (ref 135–450)
PMV BLD AUTO: 8 FL (ref 5–10.5)
PO2, VEN: 34.7 MMHG (ref 25–40)
POTASSIUM REFLEX MAGNESIUM: 4.4 MMOL/L (ref 3.5–5.1)
PRO-BNP: 810 PG/ML (ref 0–449)
RAPID INFLUENZA  B AGN: NEGATIVE
RAPID INFLUENZA A AGN: NEGATIVE
RBC # BLD: 4.64 M/UL (ref 4.2–5.9)
SODIUM BLD-SCNC: 134 MMOL/L (ref 136–145)
TCO2 CALC VENOUS: 29 MMOL/L
TROPONIN: <0.01 NG/ML
WBC # BLD: 4.5 K/UL (ref 4–11)

## 2019-12-25 PROCEDURE — 87804 INFLUENZA ASSAY W/OPTIC: CPT

## 2019-12-25 PROCEDURE — 93005 ELECTROCARDIOGRAM TRACING: CPT | Performed by: EMERGENCY MEDICINE

## 2019-12-25 PROCEDURE — 99284 EMERGENCY DEPT VISIT MOD MDM: CPT

## 2019-12-25 PROCEDURE — 6370000000 HC RX 637 (ALT 250 FOR IP): Performed by: EMERGENCY MEDICINE

## 2019-12-25 PROCEDURE — 94644 CONT INHLJ TX 1ST HOUR: CPT

## 2019-12-25 PROCEDURE — 80048 BASIC METABOLIC PNL TOTAL CA: CPT

## 2019-12-25 PROCEDURE — 71046 X-RAY EXAM CHEST 2 VIEWS: CPT

## 2019-12-25 PROCEDURE — 82803 BLOOD GASES ANY COMBINATION: CPT

## 2019-12-25 PROCEDURE — 83880 ASSAY OF NATRIURETIC PEPTIDE: CPT

## 2019-12-25 PROCEDURE — 85025 COMPLETE CBC W/AUTO DIFF WBC: CPT

## 2019-12-25 PROCEDURE — 84484 ASSAY OF TROPONIN QUANT: CPT

## 2019-12-25 RX ORDER — AZITHROMYCIN 250 MG/1
250 TABLET, FILM COATED ORAL DAILY
Qty: 4 TABLET | Refills: 0 | Status: SHIPPED | OUTPATIENT
Start: 2019-12-25 | End: 2019-12-29

## 2019-12-25 RX ORDER — PREDNISONE 20 MG/1
40 TABLET ORAL DAILY
Qty: 20 TABLET | Refills: 0 | Status: SHIPPED | OUTPATIENT
Start: 2019-12-25 | End: 2020-01-04

## 2019-12-25 RX ORDER — AZITHROMYCIN 250 MG/1
TABLET, FILM COATED ORAL
Status: DISCONTINUED
Start: 2019-12-25 | End: 2019-12-25 | Stop reason: HOSPADM

## 2019-12-25 RX ORDER — AZITHROMYCIN 250 MG/1
500 TABLET, FILM COATED ORAL ONCE
Status: COMPLETED | OUTPATIENT
Start: 2019-12-25 | End: 2019-12-25

## 2019-12-25 RX ORDER — IPRATROPIUM BROMIDE AND ALBUTEROL SULFATE 2.5; .5 MG/3ML; MG/3ML
3 SOLUTION RESPIRATORY (INHALATION) ONCE
Status: COMPLETED | OUTPATIENT
Start: 2019-12-25 | End: 2019-12-25

## 2019-12-25 RX ORDER — PREDNISONE 20 MG/1
40 TABLET ORAL ONCE
Status: COMPLETED | OUTPATIENT
Start: 2019-12-25 | End: 2019-12-25

## 2019-12-25 RX ADMIN — AZITHROMYCIN 500 MG: 250 TABLET, FILM COATED ORAL at 20:33

## 2019-12-25 RX ADMIN — IPRATROPIUM BROMIDE AND ALBUTEROL SULFATE 3 AMPULE: .5; 3 SOLUTION RESPIRATORY (INHALATION) at 19:17

## 2019-12-25 RX ADMIN — PREDNISONE 40 MG: 20 TABLET ORAL at 20:33

## 2019-12-26 LAB
EKG ATRIAL RATE: 64 BPM
EKG DIAGNOSIS: NORMAL
EKG P AXIS: 81 DEGREES
EKG P-R INTERVAL: 136 MS
EKG Q-T INTERVAL: 440 MS
EKG QRS DURATION: 142 MS
EKG QTC CALCULATION (BAZETT): 453 MS
EKG R AXIS: 104 DEGREES
EKG T AXIS: 57 DEGREES
EKG VENTRICULAR RATE: 64 BPM

## 2019-12-26 PROCEDURE — 93010 ELECTROCARDIOGRAM REPORT: CPT | Performed by: INTERNAL MEDICINE

## 2019-12-31 ENCOUNTER — OFFICE VISIT (OUTPATIENT)
Dept: INTERNAL MEDICINE CLINIC | Age: 84
End: 2019-12-31
Payer: MEDICARE

## 2019-12-31 VITALS
RESPIRATION RATE: 20 BRPM | HEART RATE: 83 BPM | WEIGHT: 93 LBS | SYSTOLIC BLOOD PRESSURE: 116 MMHG | DIASTOLIC BLOOD PRESSURE: 72 MMHG | OXYGEN SATURATION: 95 % | BODY MASS INDEX: 15.48 KG/M2

## 2019-12-31 DIAGNOSIS — I25.10 CORONARY ARTERY DISEASE INVOLVING NATIVE CORONARY ARTERY OF NATIVE HEART WITHOUT ANGINA PECTORIS: ICD-10-CM

## 2019-12-31 DIAGNOSIS — J21.9 ACUTE BRONCHIOLITIS DUE TO UNSPECIFIED ORGANISM: Primary | ICD-10-CM

## 2019-12-31 DIAGNOSIS — J45.22 MILD INTERMITTENT ASTHMA WITH STATUS ASTHMATICUS: ICD-10-CM

## 2019-12-31 DIAGNOSIS — J44.9 CHRONIC OBSTRUCTIVE PULMONARY DISEASE, UNSPECIFIED COPD TYPE (HCC): ICD-10-CM

## 2019-12-31 PROCEDURE — 4004F PT TOBACCO SCREEN RCVD TLK: CPT | Performed by: INTERNAL MEDICINE

## 2019-12-31 PROCEDURE — 99214 OFFICE O/P EST MOD 30 MIN: CPT | Performed by: INTERNAL MEDICINE

## 2019-12-31 PROCEDURE — G8427 DOCREV CUR MEDS BY ELIG CLIN: HCPCS | Performed by: INTERNAL MEDICINE

## 2019-12-31 PROCEDURE — 4040F PNEUMOC VAC/ADMIN/RCVD: CPT | Performed by: INTERNAL MEDICINE

## 2019-12-31 PROCEDURE — 1123F ACP DISCUSS/DSCN MKR DOCD: CPT | Performed by: INTERNAL MEDICINE

## 2019-12-31 PROCEDURE — G8926 SPIRO NO PERF OR DOC: HCPCS | Performed by: INTERNAL MEDICINE

## 2019-12-31 PROCEDURE — G8419 CALC BMI OUT NRM PARAM NOF/U: HCPCS | Performed by: INTERNAL MEDICINE

## 2019-12-31 PROCEDURE — G8598 ASA/ANTIPLAT THER USED: HCPCS | Performed by: INTERNAL MEDICINE

## 2019-12-31 PROCEDURE — 3023F SPIROM DOC REV: CPT | Performed by: INTERNAL MEDICINE

## 2019-12-31 PROCEDURE — G8482 FLU IMMUNIZE ORDER/ADMIN: HCPCS | Performed by: INTERNAL MEDICINE

## 2019-12-31 RX ORDER — BENZONATATE 100 MG/1
100 CAPSULE ORAL 3 TIMES DAILY PRN
Qty: 15 CAPSULE | Refills: 0 | Status: SHIPPED | OUTPATIENT
Start: 2019-12-31 | End: 2020-01-01 | Stop reason: CLARIF

## 2019-12-31 RX ORDER — DOXYCYCLINE HYCLATE 100 MG
100 TABLET ORAL 2 TIMES DAILY
Qty: 14 TABLET | Refills: 0 | Status: SHIPPED | OUTPATIENT
Start: 2019-12-31 | End: 2020-01-07

## 2019-12-31 ASSESSMENT — PATIENT HEALTH QUESTIONNAIRE - PHQ9
SUM OF ALL RESPONSES TO PHQ QUESTIONS 1-9: 0
2. FEELING DOWN, DEPRESSED OR HOPELESS: 0
SUM OF ALL RESPONSES TO PHQ QUESTIONS 1-9: 0
SUM OF ALL RESPONSES TO PHQ9 QUESTIONS 1 & 2: 0
1. LITTLE INTEREST OR PLEASURE IN DOING THINGS: 0

## 2020-01-01 ENCOUNTER — VIRTUAL VISIT (OUTPATIENT)
Dept: INTERNAL MEDICINE CLINIC | Age: 85
End: 2020-01-01
Payer: MEDICARE

## 2020-01-01 ENCOUNTER — TELEPHONE (OUTPATIENT)
Dept: INTERNAL MEDICINE CLINIC | Age: 85
End: 2020-01-01

## 2020-01-01 ENCOUNTER — OFFICE VISIT (OUTPATIENT)
Dept: INTERNAL MEDICINE CLINIC | Age: 85
End: 2020-01-01
Payer: MEDICARE

## 2020-01-01 ENCOUNTER — HOSPITAL ENCOUNTER (OUTPATIENT)
Age: 85
Discharge: HOME | End: 2020-09-08
Payer: MEDICARE

## 2020-01-01 ENCOUNTER — APPOINTMENT (OUTPATIENT)
Dept: CT IMAGING | Age: 85
DRG: 204 | End: 2020-01-01
Payer: MEDICARE

## 2020-01-01 ENCOUNTER — HOSPITAL ENCOUNTER (INPATIENT)
Age: 85
LOS: 2 days | Discharge: HOME | DRG: 204 | End: 2020-10-02
Attending: EMERGENCY MEDICINE | Admitting: INTERNAL MEDICINE
Payer: MEDICARE

## 2020-01-01 VITALS
HEIGHT: 65 IN | BODY MASS INDEX: 14.83 KG/M2 | SYSTOLIC BLOOD PRESSURE: 110 MMHG | OXYGEN SATURATION: 94 % | TEMPERATURE: 97.8 F | HEART RATE: 56 BPM | DIASTOLIC BLOOD PRESSURE: 58 MMHG | WEIGHT: 89 LBS

## 2020-01-01 VITALS
BODY MASS INDEX: 14.81 KG/M2 | SYSTOLIC BLOOD PRESSURE: 109 MMHG | WEIGHT: 83.6 LBS | HEART RATE: 86 BPM | OXYGEN SATURATION: 93 % | RESPIRATION RATE: 16 BRPM | DIASTOLIC BLOOD PRESSURE: 65 MMHG | HEIGHT: 63 IN | TEMPERATURE: 98.2 F

## 2020-01-01 LAB
A/G RATIO: 1.3 (ref 1.1–2.2)
A/G RATIO: 1.3 (ref 1.1–2.2)
ALBUMIN SERPL-MCNC: 4.3 G/DL (ref 3.4–5)
ALBUMIN SERPL-MCNC: 4.5 G/DL (ref 3.4–5)
ALP BLD-CCNC: 61 U/L (ref 40–129)
ALP BLD-CCNC: 63 U/L (ref 40–129)
ALT SERPL-CCNC: 10 U/L (ref 10–40)
ALT SERPL-CCNC: 9 U/L (ref 10–40)
ANION GAP SERPL CALCULATED.3IONS-SCNC: 10 MMOL/L (ref 3–16)
ANION GAP SERPL CALCULATED.3IONS-SCNC: 10 MMOL/L (ref 3–16)
ANION GAP SERPL CALCULATED.3IONS-SCNC: 11 MMOL/L (ref 3–16)
ANION GAP SERPL CALCULATED.3IONS-SCNC: 8 MMOL/L (ref 3–16)
AST SERPL-CCNC: 18 U/L (ref 15–37)
AST SERPL-CCNC: 27 U/L (ref 15–37)
BASOPHILS ABSOLUTE: 0.1 K/UL (ref 0–0.2)
BASOPHILS RELATIVE PERCENT: 0.5 %
BASOPHILS RELATIVE PERCENT: 0.8 %
BASOPHILS RELATIVE PERCENT: 0.9 %
BILIRUB SERPL-MCNC: 0.4 MG/DL (ref 0–1)
BILIRUB SERPL-MCNC: 0.5 MG/DL (ref 0–1)
BILIRUBIN URINE: NEGATIVE
BLOOD, URINE: NEGATIVE
BUN BLDV-MCNC: 18 MG/DL (ref 7–20)
BUN BLDV-MCNC: 20 MG/DL (ref 7–20)
BUN BLDV-MCNC: 21 MG/DL (ref 7–20)
BUN BLDV-MCNC: 22 MG/DL (ref 7–20)
CALCIUM SERPL-MCNC: 9.1 MG/DL (ref 8.3–10.6)
CALCIUM SERPL-MCNC: 9.3 MG/DL (ref 8.3–10.6)
CALCIUM SERPL-MCNC: 9.5 MG/DL (ref 8.3–10.6)
CALCIUM SERPL-MCNC: 9.7 MG/DL (ref 8.3–10.6)
CHLORIDE BLD-SCNC: 101 MMOL/L (ref 99–110)
CHLORIDE BLD-SCNC: 102 MMOL/L (ref 99–110)
CHLORIDE BLD-SCNC: 102 MMOL/L (ref 99–110)
CHLORIDE BLD-SCNC: 99 MMOL/L (ref 99–110)
CHOLESTEROL, TOTAL: 163 MG/DL (ref 0–199)
CLARITY: CLEAR
CO2: 24 MMOL/L (ref 21–32)
CO2: 26 MMOL/L (ref 21–32)
CO2: 26 MMOL/L (ref 21–32)
CO2: 27 MMOL/L (ref 21–32)
COLOR: YELLOW
CREAT SERPL-MCNC: 0.7 MG/DL (ref 0.8–1.3)
CREAT SERPL-MCNC: 0.7 MG/DL (ref 0.8–1.3)
CREAT SERPL-MCNC: 0.8 MG/DL (ref 0.8–1.3)
CREAT SERPL-MCNC: 0.8 MG/DL (ref 0.8–1.3)
EOSINOPHILS ABSOLUTE: 0.4 K/UL (ref 0–0.6)
EOSINOPHILS ABSOLUTE: 0.5 K/UL (ref 0–0.6)
EOSINOPHILS ABSOLUTE: 1 K/UL (ref 0–0.6)
EOSINOPHILS RELATIVE PERCENT: 10.4 %
EOSINOPHILS RELATIVE PERCENT: 3 %
EOSINOPHILS RELATIVE PERCENT: 5.7 %
GFR AFRICAN AMERICAN: >60
GFR NON-AFRICAN AMERICAN: >60
GLOBULIN: 3.2 G/DL
GLOBULIN: 3.4 G/DL
GLUCOSE BLD-MCNC: 131 MG/DL (ref 70–99)
GLUCOSE BLD-MCNC: 87 MG/DL (ref 70–99)
GLUCOSE BLD-MCNC: 87 MG/DL (ref 70–99)
GLUCOSE BLD-MCNC: 97 MG/DL (ref 70–99)
GLUCOSE URINE: NEGATIVE MG/DL
HCT VFR BLD CALC: 40.2 % (ref 40.5–52.5)
HCT VFR BLD CALC: 41.8 % (ref 40.5–52.5)
HCT VFR BLD CALC: 43.9 % (ref 40.5–52.5)
HDLC SERPL-MCNC: 42 MG/DL (ref 40–60)
HEMOGLOBIN: 13.4 G/DL (ref 13.5–17.5)
HEMOGLOBIN: 13.7 G/DL (ref 13.5–17.5)
HEMOGLOBIN: 14.5 G/DL (ref 13.5–17.5)
KETONES, URINE: NEGATIVE MG/DL
LDL CHOLESTEROL CALCULATED: 105 MG/DL
LEUKOCYTE ESTERASE, URINE: NEGATIVE
LIPASE: 30 U/L (ref 13–60)
LYMPHOCYTES ABSOLUTE: 1.3 K/UL (ref 1–5.1)
LYMPHOCYTES ABSOLUTE: 1.5 K/UL (ref 1–5.1)
LYMPHOCYTES ABSOLUTE: 1.9 K/UL (ref 1–5.1)
LYMPHOCYTES RELATIVE PERCENT: 16.9 %
LYMPHOCYTES RELATIVE PERCENT: 19 %
LYMPHOCYTES RELATIVE PERCENT: 9.2 %
MAGNESIUM: 2 MG/DL (ref 1.8–2.4)
MAGNESIUM: 2.1 MG/DL (ref 1.8–2.4)
MCH RBC QN AUTO: 29.9 PG (ref 26–34)
MCH RBC QN AUTO: 30.3 PG (ref 26–34)
MCH RBC QN AUTO: 30.3 PG (ref 26–34)
MCHC RBC AUTO-ENTMCNC: 32.9 G/DL (ref 31–36)
MCHC RBC AUTO-ENTMCNC: 32.9 G/DL (ref 31–36)
MCHC RBC AUTO-ENTMCNC: 33.4 G/DL (ref 31–36)
MCV RBC AUTO: 90.9 FL (ref 80–100)
MCV RBC AUTO: 91.1 FL (ref 80–100)
MCV RBC AUTO: 92 FL (ref 80–100)
MICROSCOPIC EXAMINATION: NORMAL
MONOCYTES ABSOLUTE: 0.8 K/UL (ref 0–1.3)
MONOCYTES ABSOLUTE: 0.9 K/UL (ref 0–1.3)
MONOCYTES ABSOLUTE: 1.3 K/UL (ref 0–1.3)
MONOCYTES RELATIVE PERCENT: 8.6 %
MONOCYTES RELATIVE PERCENT: 9.3 %
MONOCYTES RELATIVE PERCENT: 9.4 %
NEUTROPHILS ABSOLUTE: 11.1 K/UL (ref 1.7–7.7)
NEUTROPHILS ABSOLUTE: 6 K/UL (ref 1.7–7.7)
NEUTROPHILS ABSOLUTE: 6.1 K/UL (ref 1.7–7.7)
NEUTROPHILS RELATIVE PERCENT: 60.4 %
NEUTROPHILS RELATIVE PERCENT: 67.9 %
NEUTROPHILS RELATIVE PERCENT: 78 %
NITRITE, URINE: NEGATIVE
PDW BLD-RTO: 14.2 % (ref 12.4–15.4)
PDW BLD-RTO: 14.2 % (ref 12.4–15.4)
PDW BLD-RTO: 14.3 % (ref 12.4–15.4)
PH UA: 6.5 (ref 5–8)
PLATELET # BLD: 236 K/UL (ref 135–450)
PLATELET # BLD: 239 K/UL (ref 135–450)
PLATELET # BLD: 247 K/UL (ref 135–450)
PMV BLD AUTO: 7.8 FL (ref 5–10.5)
PMV BLD AUTO: 8.1 FL (ref 5–10.5)
PMV BLD AUTO: 8.2 FL (ref 5–10.5)
POTASSIUM SERPL-SCNC: 4.2 MMOL/L (ref 3.5–5.1)
POTASSIUM SERPL-SCNC: 4.3 MMOL/L (ref 3.5–5.1)
POTASSIUM SERPL-SCNC: 4.8 MMOL/L (ref 3.5–5.1)
POTASSIUM SERPL-SCNC: 5.3 MMOL/L (ref 3.5–5.1)
PROTEIN UA: NEGATIVE MG/DL
RBC # BLD: 4.42 M/UL (ref 4.2–5.9)
RBC # BLD: 4.58 M/UL (ref 4.2–5.9)
RBC # BLD: 4.78 M/UL (ref 4.2–5.9)
SARS-COV-2, NAAT: NOT DETECTED
SODIUM BLD-SCNC: 135 MMOL/L (ref 136–145)
SODIUM BLD-SCNC: 136 MMOL/L (ref 136–145)
SODIUM BLD-SCNC: 137 MMOL/L (ref 136–145)
SODIUM BLD-SCNC: 138 MMOL/L (ref 136–145)
SPECIFIC GRAVITY UA: 1.02 (ref 1–1.03)
TOTAL PROTEIN: 7.5 G/DL (ref 6.4–8.2)
TOTAL PROTEIN: 7.9 G/DL (ref 6.4–8.2)
TRIGL SERPL-MCNC: 82 MG/DL (ref 0–150)
TROPONIN: <0.01 NG/ML
URINE TYPE: NORMAL
UROBILINOGEN, URINE: 1 E.U./DL
VLDLC SERPL CALC-MCNC: 16 MG/DL
WBC # BLD: 10 K/UL (ref 4–11)
WBC # BLD: 14.3 K/UL (ref 4–11)
WBC # BLD: 8.8 K/UL (ref 4–11)

## 2020-01-01 PROCEDURE — 94761 N-INVAS EAR/PLS OXIMETRY MLT: CPT

## 2020-01-01 PROCEDURE — 6370000000 HC RX 637 (ALT 250 FOR IP): Performed by: INTERNAL MEDICINE

## 2020-01-01 PROCEDURE — 99223 1ST HOSP IP/OBS HIGH 75: CPT | Performed by: INTERNAL MEDICINE

## 2020-01-01 PROCEDURE — 85025 COMPLETE CBC W/AUTO DIFF WBC: CPT

## 2020-01-01 PROCEDURE — 1200000000 HC SEMI PRIVATE

## 2020-01-01 PROCEDURE — G0008 ADMIN INFLUENZA VIRUS VAC: HCPCS | Performed by: NURSE PRACTITIONER

## 2020-01-01 PROCEDURE — 80053 COMPREHEN METABOLIC PANEL: CPT

## 2020-01-01 PROCEDURE — 74177 CT ABD & PELVIS W/CONTRAST: CPT

## 2020-01-01 PROCEDURE — 2580000003 HC RX 258: Performed by: INTERNAL MEDICINE

## 2020-01-01 PROCEDURE — 2700000000 HC OXYGEN THERAPY PER DAY

## 2020-01-01 PROCEDURE — 6370000000 HC RX 637 (ALT 250 FOR IP): Performed by: NURSE PRACTITIONER

## 2020-01-01 PROCEDURE — 83690 ASSAY OF LIPASE: CPT

## 2020-01-01 PROCEDURE — 80048 BASIC METABOLIC PNL TOTAL CA: CPT

## 2020-01-01 PROCEDURE — 83735 ASSAY OF MAGNESIUM: CPT

## 2020-01-01 PROCEDURE — U0002 COVID-19 LAB TEST NON-CDC: HCPCS

## 2020-01-01 PROCEDURE — 80061 LIPID PANEL: CPT

## 2020-01-01 PROCEDURE — 36415 COLL VENOUS BLD VENIPUNCTURE: CPT

## 2020-01-01 PROCEDURE — 99233 SBSQ HOSP IP/OBS HIGH 50: CPT | Performed by: INTERNAL MEDICINE

## 2020-01-01 PROCEDURE — 81003 URINALYSIS AUTO W/O SCOPE: CPT

## 2020-01-01 PROCEDURE — 99214 OFFICE O/P EST MOD 30 MIN: CPT | Performed by: INTERNAL MEDICINE

## 2020-01-01 PROCEDURE — G0439 PPPS, SUBSEQ VISIT: HCPCS | Performed by: NURSE PRACTITIONER

## 2020-01-01 PROCEDURE — 84484 ASSAY OF TROPONIN QUANT: CPT

## 2020-01-01 PROCEDURE — 1123F ACP DISCUSS/DSCN MKR DOCD: CPT | Performed by: NURSE PRACTITIONER

## 2020-01-01 PROCEDURE — 94640 AIRWAY INHALATION TREATMENT: CPT

## 2020-01-01 PROCEDURE — 2580000003 HC RX 258: Performed by: NURSE PRACTITIONER

## 2020-01-01 PROCEDURE — 94150 VITAL CAPACITY TEST: CPT

## 2020-01-01 PROCEDURE — 4040F PNEUMOC VAC/ADMIN/RCVD: CPT | Performed by: NURSE PRACTITIONER

## 2020-01-01 PROCEDURE — 99285 EMERGENCY DEPT VISIT HI MDM: CPT

## 2020-01-01 PROCEDURE — 90694 VACC AIIV4 NO PRSRV 0.5ML IM: CPT | Performed by: NURSE PRACTITIONER

## 2020-01-01 PROCEDURE — 6360000004 HC RX CONTRAST MEDICATION: Performed by: NURSE PRACTITIONER

## 2020-01-01 RX ORDER — CARBOXYMETHYLCELLULOSE SODIUM 10 MG/ML
1 GEL OPHTHALMIC PRN
Status: DISCONTINUED | OUTPATIENT
Start: 2020-01-01 | End: 2020-01-01 | Stop reason: HOSPADM

## 2020-01-01 RX ORDER — LIDOCAINE HYDROCHLORIDE 10 MG/ML
0.3 INJECTION, SOLUTION EPIDURAL; INFILTRATION; INTRACAUDAL; PERINEURAL
Status: CANCELLED | OUTPATIENT
Start: 2020-01-01 | End: 2020-01-01

## 2020-01-01 RX ORDER — ATORVASTATIN CALCIUM 80 MG/1
80 TABLET, FILM COATED ORAL NIGHTLY
Status: DISCONTINUED | OUTPATIENT
Start: 2020-01-01 | End: 2020-01-01 | Stop reason: HOSPADM

## 2020-01-01 RX ORDER — OMEPRAZOLE 20 MG/1
40 CAPSULE, DELAYED RELEASE ORAL DAILY
COMMUNITY

## 2020-01-01 RX ORDER — POTASSIUM CHLORIDE 7.45 MG/ML
10 INJECTION INTRAVENOUS PRN
Status: DISCONTINUED | OUTPATIENT
Start: 2020-01-01 | End: 2020-01-01 | Stop reason: HOSPADM

## 2020-01-01 RX ORDER — SODIUM CHLORIDE 0.9 % (FLUSH) 0.9 %
10 SYRINGE (ML) INJECTION PRN
Status: CANCELLED | OUTPATIENT
Start: 2020-01-01

## 2020-01-01 RX ORDER — PROMETHAZINE HYDROCHLORIDE 25 MG/1
12.5 TABLET ORAL EVERY 6 HOURS PRN
Status: DISCONTINUED | OUTPATIENT
Start: 2020-01-01 | End: 2020-01-01 | Stop reason: HOSPADM

## 2020-01-01 RX ORDER — ACETAMINOPHEN 650 MG/1
650 SUPPOSITORY RECTAL EVERY 6 HOURS PRN
Status: DISCONTINUED | OUTPATIENT
Start: 2020-01-01 | End: 2020-01-01 | Stop reason: HOSPADM

## 2020-01-01 RX ORDER — 0.9 % SODIUM CHLORIDE 0.9 %
500 INTRAVENOUS SOLUTION INTRAVENOUS ONCE
Status: COMPLETED | OUTPATIENT
Start: 2020-01-01 | End: 2020-01-01

## 2020-01-01 RX ORDER — IPRATROPIUM BROMIDE AND ALBUTEROL SULFATE 2.5; .5 MG/3ML; MG/3ML
1 SOLUTION RESPIRATORY (INHALATION) EVERY 4 HOURS PRN
Status: DISCONTINUED | OUTPATIENT
Start: 2020-01-01 | End: 2020-01-01 | Stop reason: HOSPADM

## 2020-01-01 RX ORDER — POTASSIUM CHLORIDE 20 MEQ/1
40 TABLET, EXTENDED RELEASE ORAL PRN
Status: DISCONTINUED | OUTPATIENT
Start: 2020-01-01 | End: 2020-01-01 | Stop reason: HOSPADM

## 2020-01-01 RX ORDER — MAGNESIUM SULFATE 1 G/100ML
1 INJECTION INTRAVENOUS PRN
Status: DISCONTINUED | OUTPATIENT
Start: 2020-01-01 | End: 2020-01-01 | Stop reason: HOSPADM

## 2020-01-01 RX ORDER — ALBUTEROL SULFATE 2.5 MG/3ML
2.5 SOLUTION RESPIRATORY (INHALATION)
Status: DISCONTINUED | OUTPATIENT
Start: 2020-01-01 | End: 2020-01-01 | Stop reason: HOSPADM

## 2020-01-01 RX ORDER — ASPIRIN 81 MG/1
81 TABLET, CHEWABLE ORAL DAILY
Status: DISCONTINUED | OUTPATIENT
Start: 2020-01-01 | End: 2020-01-01 | Stop reason: HOSPADM

## 2020-01-01 RX ORDER — SODIUM CHLORIDE, SODIUM LACTATE, POTASSIUM CHLORIDE, CALCIUM CHLORIDE 600; 310; 30; 20 MG/100ML; MG/100ML; MG/100ML; MG/100ML
INJECTION, SOLUTION INTRAVENOUS CONTINUOUS
Status: CANCELLED | OUTPATIENT
Start: 2020-01-01

## 2020-01-01 RX ORDER — ACETAMINOPHEN 325 MG/1
650 TABLET ORAL EVERY 6 HOURS PRN
Status: DISCONTINUED | OUTPATIENT
Start: 2020-01-01 | End: 2020-01-01 | Stop reason: HOSPADM

## 2020-01-01 RX ORDER — SODIUM CHLORIDE 0.9 % (FLUSH) 0.9 %
10 SYRINGE (ML) INJECTION PRN
Status: DISCONTINUED | OUTPATIENT
Start: 2020-01-01 | End: 2020-01-01 | Stop reason: HOSPADM

## 2020-01-01 RX ORDER — SODIUM CHLORIDE 0.9 % (FLUSH) 0.9 %
10 SYRINGE (ML) INJECTION EVERY 12 HOURS SCHEDULED
Status: CANCELLED | OUTPATIENT
Start: 2020-01-01

## 2020-01-01 RX ORDER — CYCLOBENZAPRINE HCL 10 MG
10 TABLET ORAL ONCE
Status: COMPLETED | OUTPATIENT
Start: 2020-01-01 | End: 2020-01-01

## 2020-01-01 RX ORDER — ONDANSETRON 2 MG/ML
4 INJECTION INTRAMUSCULAR; INTRAVENOUS EVERY 6 HOURS PRN
Status: DISCONTINUED | OUTPATIENT
Start: 2020-01-01 | End: 2020-01-01 | Stop reason: HOSPADM

## 2020-01-01 RX ORDER — PANTOPRAZOLE SODIUM 40 MG/1
40 TABLET, DELAYED RELEASE ORAL
Status: DISCONTINUED | OUTPATIENT
Start: 2020-01-01 | End: 2020-01-01 | Stop reason: HOSPADM

## 2020-01-01 RX ADMIN — ASPIRIN 81 MG: 81 TABLET, CHEWABLE ORAL at 11:25

## 2020-01-01 RX ADMIN — SODIUM CHLORIDE 500 ML: 9 INJECTION, SOLUTION INTRAVENOUS at 13:53

## 2020-01-01 RX ADMIN — GLYCOPYRROLATE AND FORMOTEROL FUMARATE 2 PUFF: 9; 4.8 AEROSOL, METERED RESPIRATORY (INHALATION) at 08:41

## 2020-01-01 RX ADMIN — ATORVASTATIN CALCIUM 80 MG: 80 TABLET, FILM COATED ORAL at 20:44

## 2020-01-01 RX ADMIN — Medication 10 ML: at 20:08

## 2020-01-01 RX ADMIN — Medication 10 ML: at 20:44

## 2020-01-01 RX ADMIN — GLYCOPYRROLATE AND FORMOTEROL FUMARATE 2 PUFF: 9; 4.8 AEROSOL, METERED RESPIRATORY (INHALATION) at 20:41

## 2020-01-01 RX ADMIN — CYCLOBENZAPRINE 10 MG: 10 TABLET, FILM COATED ORAL at 13:53

## 2020-01-01 RX ADMIN — GLYCOPYRROLATE AND FORMOTEROL FUMARATE 2 PUFF: 9; 4.8 AEROSOL, METERED RESPIRATORY (INHALATION) at 12:43

## 2020-01-01 RX ADMIN — IOPAMIDOL 75 ML: 755 INJECTION, SOLUTION INTRAVENOUS at 15:55

## 2020-01-01 RX ADMIN — ATORVASTATIN CALCIUM 80 MG: 80 TABLET, FILM COATED ORAL at 20:08

## 2020-01-01 ASSESSMENT — PATIENT HEALTH QUESTIONNAIRE - PHQ9
SUM OF ALL RESPONSES TO PHQ QUESTIONS 1-9: 0
SUM OF ALL RESPONSES TO PHQ9 QUESTIONS 1 & 2: 0
SUM OF ALL RESPONSES TO PHQ9 QUESTIONS 1 & 2: 0
2. FEELING DOWN, DEPRESSED OR HOPELESS: 0
SUM OF ALL RESPONSES TO PHQ QUESTIONS 1-9: 0
1. LITTLE INTEREST OR PLEASURE IN DOING THINGS: 0
SUM OF ALL RESPONSES TO PHQ QUESTIONS 1-9: 0
2. FEELING DOWN, DEPRESSED OR HOPELESS: 0
1. LITTLE INTEREST OR PLEASURE IN DOING THINGS: 0
SUM OF ALL RESPONSES TO PHQ QUESTIONS 1-9: 0

## 2020-01-01 ASSESSMENT — PAIN SCALES - GENERAL
PAINLEVEL_OUTOF10: 0
PAINLEVEL_OUTOF10: 0

## 2020-01-01 ASSESSMENT — LIFESTYLE VARIABLES: HOW OFTEN DO YOU HAVE A DRINK CONTAINING ALCOHOL: 0

## 2020-03-11 RX ORDER — ATORVASTATIN CALCIUM 80 MG/1
TABLET, FILM COATED ORAL
Qty: 90 TABLET | Refills: 2 | Status: SHIPPED | OUTPATIENT
Start: 2020-03-11

## 2020-03-11 NOTE — TELEPHONE ENCOUNTER
Emily Martines from the pharmacy is calling for a refill for patient for his lipitor. Refill request for lipitor medication.      Name of Pharmacy- [facility]    Last visit - 1/30/20     Pending visit - 07/06/20    Last refill -7/9/19    Medication Contract signed -N/A   Last Oarrs ran- N/A    Additional Comments med pended and pharmacy verified

## 2020-07-06 NOTE — PROGRESS NOTES
Lydia Hardin is a 80 y.o. male evaluated via telephone on 7/6/2020. Consent:  He and/or health care decision maker is aware that that he may receive a bill for this telephone service, depending on his insurance coverage, and has provided verbal consent to proceed: Yes      Documentation:  I communicated with the patient and/or health care decision maker about    Patient with hyperlipidemia, COPD, CAD, tobacco history, chronic low back pain with right lumbar radiculopathy. Patient states caring for his wife who has severe dementia. Reviewed last office visit with me 12/31/2019. Reviewed laboratory data 12/25/2019: Chemistry panel: Sodium: 134. proBNP: 810. Troponin: Less than 0.01. CBC unremarkable. Labs ordered but not obtained before this office visit  Health maintenance: Discussed need for shingles, a [address]. Review of Systems   Constitutional: negative   HENT: negative   EYES: negative   Respiratory: negative   Gastrointestinal: negative   Endocrine: negative   Musculoskeletal: negative   Skin: negative   Allergic/Immunological: negative   Hematological: negative   Psychiatric/Behavorial: Notes stress caring for his wife. CV: negative   CNS: negative   :Negative   S/E:Negative  Renal: Negative      Details of this discussion including any medical advice provided:    Impression/Plan:    1. Hyperlipidemia, unspecified hyperlipidemia type  No recent laboratory studies. Obtain laboratory studies now fasting call me for results as well as before next office visit  - Lipid Panel; Future  - Comprehensive Metabolic Panel; Future    2. Chronic obstructive pulmonary disease, unspecified COPD type ([facility]  Baseline. Continue present medicine    3. Coronary artery disease involving native coronary artery of native heart without angina pectoris  No complaint of chest pain or shortness of breath. Continue present treatment    4.  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 Return to follow-up  Dr. benitez    I affirm this is a Patient Initiated Episode with a Patient who has not had a related appointment within my department in the past 7 days or scheduled within the next 24 hours.     Patient identification was verified at the start of the visit: Yes    Total Time: minutes: 21-30 minutes    Note: not billable if this call serves to triage the patient into an appointment for the relevant concern      Jens Baum

## 2020-09-24 NOTE — PROGRESS NOTES
Medicare Annual Wellness Visit  Name: Debbie Pair Date: 2020   MRN: <[id number]> Sex: Male   Age: 80 y.o. Ethnicity: Non-/Non    : 1932 Race: Paula Hearing is here for Medicare AWV    Screenings for behavioral, psychosocial and functional/safety risks, and cognitive dysfunction are all negative except as indicated below. These results, as well as other patient data from the [address] [facility] [address] form, are documented in Flowsheets linked to this Encounter. No Known Allergies    Prior to Visit Medications    Medication Sig Taking? Authorizing Provider   atorvastatin (LIPITOR) 80 MG tablet TAKE 1 TABLET BY MOUTH EVERY DAY. For high cholesterol Yes Daniel Wu MD   Tiotropium Bromide-Olodaterol 2.5-2.5 MCG/ACT AERS Inhale 2 puffs into the lungs daily Yes BURAK Genao - CNP   pantoprazole (PROTONIX) 20 MG tablet Take 20 mg by mouth daily Yes Historical Provider, MD   aspirin 81 MG tablet Take 81 mg by mouth daily.  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     [facility]     Emphysema of lung ([facility]     Hyperlipidemia     Osteoarthritis     Pneumonia        Past Surgical History:   Procedure Laterality Date    CARDIAC SURGERY      stent    COLONOSCOPY      COLONOSCOPY  2015    Diverticulosis    CORONARY ARTERY BYPASS GRAFT      DIAGNOSTIC CARDIAC CATH LAB PROCEDURE      ENDOSCOPY, COLON, DIAGNOSTIC      ENDOSCOPY, COLON, DIAGNOSTIC  12 Oct 2015    biopsy    EYE SURGERY Bilateral     Cataract    HERNIA REPAIR      KNEE ARTHROSCOPY Bilateral     OTHER SURGICAL HISTORY      patient states he had \"a stent put in from his heart to his digestive system\"    ME REPAIR ROTATOR CUFF,ACUTE Left 2018    EXAM UNDER ANESTHESIA, VIDEO ARTHROSCOPY LEFT SHOULDER, MINI OPEN ROTATOR CUFF REPAIR, NEER ACROMIOPLASTY, POSSIBLE CHRISTIANO PROCEDURE performed by Maty Hunter MD at [address] PRE-[facility] / [address] Right     arm    SHOULDER ARTHROSCOPY Right     SHOULDER SURGERY Left     SPINE SURGERY  2006    Lumbar Disc    UPPER GASTROINTESTINAL ENDOSCOPY  10/12/2015    Hiatal Hernia. Small Bowel Biopsy Negative       Family History   Problem Relation Age of Onset    Arthritis Mother     Other Mother         PN    Heart Disease Father        CareTeam (Including outside providers/suppliers regularly involved in providing care):   Patient Care Team:  Mari Chambers MD as PCP - General (Internal Medicine)  Mari Chambers MD as PCP - [facility]  Rachel Amanda MD as Consulting Physician (Cardiology)  Meka Rojo MD as Consulting Physician (Pulmonology)  Meka Rojo MD as Consulting Physician (Pulmonology)  Meka Rojo MD as Consulting Physician (Pulmonology)    Wt Readings from Last 3 Encounters:   09/24/20 89 lb (40.4 kg)   12/31/19 93 lb (42.2 kg)   12/25/19 90 lb (40.8 kg)     Vitals:    09/24/20 1003   BP: (!) 110/58   Pulse: 56   Temp: 97.8 °F (36.6 °C)   SpO2: 94%   Weight: 89 lb (40.4 kg)   Height: 5' 5\" (1.651 m)     Body mass index is 14.81 kg/m². Based upon direct observation of the patient, evaluation of cognition reveals recent and remote memory intact.     General Appearance: alert and oriented to person, place and time, well developed and well- nourished, in no acute distress  Skin: warm and dry, no rash or erythema  Head: normocephalic and atraumatic  Eyes: pupils equal, round, and reactive to light, extraocular eye movements intact, conjunctivae normal  ENT: tympanic membrane, external ear and ear canal normal bilaterally, nose without deformity, nasal mucosa and turbinates normal without polyps  Neck: supple and non-tender without mass, no thyromegaly or thyroid nodules, no cervical lymphadenopathy  Pulmonary/Chest: clear to auscultation bilaterally- no wheezes, rales or rhonchi, normal air movement, no respiratory distress  Cardiovascular: normal rate, regular rhythm, normal S1 and S2, no murmurs, rubs, clicks, or gallops, distal pulses intact, no carotid bruits  Abdomen: soft, non-tender, non-distended, normal bowel sounds, no masses or organomegaly  Extremities: no cyanosis, clubbing or edema  Musculoskeletal: normal range of motion, no joint swelling, deformity or tenderness  Neurologic: reflexes normal and symmetric, no cranial nerve deficit, gait, coordination and speech normal    Patient's complete Health Risk Assessment and screening values have been reviewed and are found in Flowsheets. The following problems were reviewed today and where indicated follow up appointments were made and/or referrals ordered. Positive Risk Factor Screenings with Interventions:     Substance History:  Social History     Tobacco History     Smoking Status  Current Every Day Smoker Smoking Frequency  1 pack/day for 66 years (77 pk yrs) Smoking Tobacco Type  Cigarettes    Smokeless Tobacco Use  Never Used          Alcohol History     Alcohol Use Status  No          Drug Use     Drug Use Status  No          Sexual Activity     Sexually Active  Yes Partners  Female               Alcohol Screening:       A score of 8 or more is associated with harmful or hazardous drinking. A score of 13 or more in women, and 15 or more in men, is likely to indicate alcohol dependence. Substance Abuse Interventions:  · Pt does not drink alcohol    General Health:  General  In general, how would you say your health is?: Fair  In the past 7 days, have you experienced any of the following?  New or Increased Pain, New or Increased Fatigue, Loneliness, Social Isolation, Stress or Anger?: (!) Loneliness, New or Increased Fatigue  Do you get the social and emotional support that you need?: Yes  Do you have a Living Will?: Yes  General Health Risk Interventions:  · Increased loneliness is due to recent death of wife. Chilren and grandchildren are giving good support    Health Habits/Nutrition:  Health Habits/Nutrition  Do you exercise for at least 20 minutes 2-3 times per week?: Yes  Have you lost any weight without trying in the past 3 months?: No  Do you eat fewer than 2 meals per day?: No  Have you seen a dentist within the past year?: (!) No  Body mass index is 14.81 kg/m². Health Habits/Nutrition Interventions:  · Dental exam overdue:  patient declines dental evaluation    Personalized Preventive Plan   Current Health Maintenance Status  Immunization History   Administered Date(s) Administered    Influenza Vaccine, unspecified formulation 10/06/2010, 10/01/2016    Influenza, High Dose (Fluzone 65 yrs and older) 09/14/2011, 10/02/2012, 10/09/2013, 10/30/2014, 11/13/2015, 09/01/2017, 10/11/2018    Influenza, Triv, inactivated, subunit, adjuvanted, IM (Fluad 65 yrs and older) 10/16/2019    Pneumococcal Conjugate 13-valent ([id number]) 09/24/2015    Pneumococcal Polysaccharide ([id number]) 10/06/2010, 01/31/2017    Td, unspecified formulation 05/07/2015    Zoster Live (Zostavax) 07/30/2013        Health Maintenance   Topic Date Due    Shingles Vaccine (2 of 3) 09/24/2013    Annual Wellness Visit (AWV)  05/29/2019    Flu vaccine (1) 09/01/2020    DTaP/Tdap/Td vaccine (1 - Tdap) 02/14/2025 (Originally 2/25/1951)    Lipid screen  09/08/2021    Pneumococcal 65+ years Vaccine  Completed    Hepatitis A vaccine  Aged Out    Hepatitis B vaccine  Aged Out    Hib vaccine  Aged Out    Meningococcal (ACWY) vaccine  Aged Out     Recommendations for CSID Due: see orders and patient instructions/AVS.  .   Recommended screening schedule for the next 5-10 years is provided to the patient in written form: see Patient Instructions/AVS.      1. Routine general medical examination at a health care facility  Repeat in one year    2. Hyperlipidemia, unspecified hyperlipidemia type  Reviewed lab results with patient   Stable; monitor; continue current meds     3. Diabetes mellitus screening  Update labs before next OV    4. Immunization due  Flu shot given today  - INFLUENZA, QUADV, ADJUVANTED, 65 YRS =, IM, PF, PREFILL SYR, 0.5ML (FLUAD)    5. Grief  Allowed time for patient to vent  Provided emotional support    Return to see Dr. Les Oconnell in 6 months  Return for medicare visit in one year.

## 2020-09-24 NOTE — PATIENT INSTRUCTIONS
1.  Decrease by one cigarette every 3 days--arron number on calendar for a visual reinforcement. 2.  Replace behavior one cigarette at a time  3. Keep a large glass of ice water with a straw and sip on it every time you see it. This will help with the urge to have something between your lips and also help to flush out poisons. 4. Remember that you get a bigger kick from caffeine when nicotine is reduced. May need to decrease caffeine by 50%     Personalized Preventive Plan for Greta Chavez - 9/24/2020  Medicare offers a range of preventive health benefits. Some of the tests and screenings are paid in full while other may be subject to a deductible, co-insurance, and/or copay. Some of these benefits include a comprehensive review of your medical history including lifestyle, illnesses that may run in your family, and various assessments and screenings as appropriate. After reviewing your medical record and screening and assessments performed today your provider may have ordered immunizations, labs, imaging, and/or referrals for you. A list of these orders (if applicable) as well as your Preventive Care list are included within your After Visit Summary for your review. Other Preventive Recommendations:    · A preventive eye exam performed by an eye specialist is recommended every 1-2 years to screen for glaucoma; cataracts, macular degeneration, and other eye disorders. · A preventive dental visit is recommended every 6 months. · Try to get at least 150 minutes of exercise per week or 10,000 steps per day on a pedometer . · Order or download the FREE \"Exercise & Physical Activity: Your Everyday Guide\" from [employer]. Call 8-991.950.2346 or search [employer] online. · You need 4249-4076 mg of calcium and 8612-7540 IU of vitamin D per day.  It is possible to meet your calcium requirement with diet alone, but a vitamin D supplement is usually necessary to

## 2020-09-30 PROBLEM — J44.9 STAGE 3 SEVERE COPD BY GOLD CLASSIFICATION (HCC): Chronic | Status: ACTIVE | Noted: 2020-01-01

## 2020-09-30 PROBLEM — J93.9 PNEUMOTHORAX ON LEFT: Status: ACTIVE | Noted: 2020-01-01

## 2020-09-30 PROBLEM — R91.8 LUNG MASS: Status: ACTIVE | Noted: 2020-01-01

## 2020-09-30 NOTE — CONSULTS
INPATIENT PULMONARY CRITICAL CARE CONSULT NOTE      Chief Complaint/Referring Provider:  Patient is being seen at the request of Baron Silva for a consultation for subcarinal mass and pneumothorax. Presenting HPI: Nina Salvador is a very pleasant 80-year-old male living in [facility]. Unfortunately his wife passed away about a month ago, his grandson lives with him. He has 6 children, living in and out of state. The patient was in the [employer] for 6 years, was [place]. The patient was a smoker for several years, did not drink much alcohol. He has a history of CAD, status post CABG ischemic cardiomyopathy, hypertension, dyslipidemia. He does have COPD, but has not been seen by pulmonologist.  He has undergone mesenteric artery and left brachial artery repair. He has had DJD, back pain, Bell's palsy, GERD, stroke. He has had multiple other surgeries as well. The patient no longer smokes. He has been somewhat frustrated that he has been losing weight without known cause. He has been evaluated at the [facility], [facility], [facility].  He was at 132 pounds, is now down to 89 pounds. He is at reduced appetite. He does have mild exertional dyspnea. He has a chronic cough with small amounts of white phlegm, denies hemoptysis. He denies PND or orthopnea, denies leg edema. The patient presented to the ER yesterday due to left-sided flank pain and left-sided chest pain, worse with deep breathing and with movement. He denied any fever, chills, sweats. To his knowledge he had no COVID-19 exposures. He had no GI or  complaints. The patient was hemodynamically stable and afebrile. He underwent CT chest, abdomen and pelvis. This showed a small spontaneous pneumothorax on the left, ill-defined mass in the subcarinal region extending to the left hilum, severe emphysema, dilated renal pelvis bilaterally, greater on the right.   Tia Sanchez discussed the patient with me, I recommended admission for bronchoscopy and further evaluation.        Patient Active Problem List    Diagnosis Date Noted    Hyperlipidemia      Priority: High    Centrilobular emphysema ([facility]      Priority: High    CAD (coronary artery disease)      Priority: High    Lung mass 09/30/2020    Pneumothorax on left 09/30/2020    Stage 3 severe COPD by GOLD classification (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     COPD (chronic obstructive pulmonary disease) (HCC)     Emphysema of lung (HCC)     Hyperlipidemia     Osteoarthritis     Pneumonia         Past Surgical History:   Procedure Laterality Date    CARDIAC SURGERY      stent    COLONOSCOPY      COLONOSCOPY  12 IOct 2015    Diverticulosis    CORONARY ARTERY BYPASS GRAFT  1990    DIAGNOSTIC CARDIAC CATH LAB PROCEDURE      ENDOSCOPY, COLON, DIAGNOSTIC      ENDOSCOPY, COLON, DIAGNOSTIC  12 Oct 2015    biopsy    EYE SURGERY Bilateral     Cataract    HERNIA REPAIR      KNEE ARTHROSCOPY Bilateral     OTHER SURGICAL HISTORY      patient states he had \"a stent put in from his heart to his digestive system\"    SC REPAIR ROTATOR CUFF,ACUTE Left 9/4/2018    EXAM UNDER ANESTHESIA, VIDEO ARTHROSCOPY LEFT SHOULDER, MINI OPEN ROTATOR CUFF REPAIR, NEER ACROMIOPLASTY, POSSIBLE CHRISTIANO PROCEDURE performed by Flo Norris MD at [facility] / [address] Right     arm    SHOULDER ARTHROSCOPY Right     SHOULDER SURGERY Left     SPINE SURGERY  2006    Lumbar Disc  UPPER GASTROINTESTINAL ENDOSCOPY  10/12/2015    Hiatal Hernia. Small Bowel Biopsy Negative        Family History   Problem Relation Age of Onset    Arthritis Mother     Other Mother         PN    Heart Disease Father         Social History     Tobacco Use    Smoking status: Current Every Day Smoker     Packs/day: 1.00     Years: 68.00     Pack years: 68.00     Types: Cigarettes    Smokeless tobacco: Never Used   Substance Use Topics    Alcohol use: No     Alcohol/week: 0.0 standard drinks        No Known Allergies      Physical Exam:  Blood pressure 124/66, pulse 86, temperature 97.9 °F (36.6 °C), temperature source Oral, resp. rate 20, height 5' 3\" (1.6 m), weight 87 lb 15.4 oz (39.9 kg), SpO2 90 %.'   Constitutional: The patient appears frail, small built, underweight and malnourished. He is in no distress. HENT:  Oropharynx is clear and moist. No lesions. Eyes:  Conjunctivae are normal. Pupils equal, round, and reactive to light. No scleral icterus. Wearing glasses  Neck: No JVD. No tracheal deviation present. No obvious thyroid mass. Cardiovascular: Normal rate, regular rhythm, normal heart sounds. No right ventricular heave. No lower extremity edema. Pulmonary/Chest: Diminished air entry. No wheezes. No rales. Chest wall is not dull to percussion. No accessory muscle usage or stridor. Abdominal: Soft. Abdomen scaphoid. No distension or tenderness. Musculoskeletal: No cyanosis. No clubbing. No obvious joint deformity. Lymphadenopathy: No cervical or supraclavicular adenopathy. Skin: Skin is warm and dry. No rash or nodules on the exposed extremities. Psychiatric: Normal mood and affect. Behavior is normal.  No anxiety. Neurologic: Alert, awake and oriented. PERRL. Speech fluent.   No obvious focal deficit, detailed exam not performed    Results:  CBC:   Recent Labs     09/30/20  1316 10/01/20  0546   WBC 8.8 10.0   HGB 14.5 13.7   HCT 43.9 41.8   MCV 92.0 91.1    247 BMP:   Recent Labs     09/30/20  1316 10/01/20  0546    136   K 5.3* 4.2   CL 99 102   CO2 27 26   BUN 20 18   CREATININE 0.8 0.7*     LIVER PROFILE:   Recent Labs     09/30/20  1316   AST 27   ALT 10   LIPASE 30.0   BILITOT 0.5   ALKPHOS 61     UA:  Recent Labs     09/30/20  1339   COLORU Yellow   PHUR 6.5   CLARITYU Clear   SPECGRAV 1.020   LEUKOCYTESUR Negative   UROBILINOGEN 1.0   BILIRUBINUR Negative   BLOODU Negative   GLUCOSEU Negative       Imaging:  I have reviewed radiology images personally. CT CHEST ABDOMEN PELVIS W CONTRAST   Final Result   Small spontaneous pneumothorax on the left. Ill-defined contiguous soft tissue mass subcarinal and extending to the left   hilum and partially surrounding the anterior aspect of the descending aorta. This is moderately suspicious for neoplasm such as infiltrating lymphoma      Severe diffuse emphysema unchanged. Dilated renal pelvis bilaterally greater on the right. This could represent   UPJ stricture or obstruction and      Findings were discussed with ERIC JONES at 4:40 pm on 9/30/2020. Ct Chest Abdomen Pelvis W Contrast    Result Date: 9/30/2020  EXAMINATION: CT OF THE CHEST, ABDOMEN, AND PELVIS WITH CONTRAST 9/30/2020 3:38 pm TECHNIQUE: CT of the chest, abdomen and pelvis was performed with the administration of intravenous contrast. Multiplanar reformatted images are provided for review. Dose modulation, iterative reconstruction, and/or weight based adjustment of the mA/kV was utilized to reduce the radiation dose to as low as reasonably achievable. COMPARISON: Chest CT PA, 01/19/2016. Chest abdomen pelvic CT, 05/21/2015. HISTORY: ORDERING SYSTEM PROVIDED HISTORY: left flank pain TECHNOLOGIST PROVIDED HISTORY: Reason for exam:->left flank pain Additional Contrast?->None Reason for Exam: left flank pain Acuity: Acute Type of Exam: Initial CHEST: Mediastinum: 1.  There is moderate soft tissue density posterior to the left hilum.  This extends to and is inseparable from the anterior aspect of the descending thoracic aorta. Greatest extension from medial to lateral is 5 cm and 1.8 cm anterior-posterior. The the anterior wall of the thoracic aorta is not visible. There is no contrast extravasation or hemorrhage. There is some increased vascularity in this region. The longitudinal extension is over 4 cm from superior to inferior. This is new from the prior study. 2. Small lymph nodes are seen in the right hilum and paratracheal space superiorly, similar to the prior study. 3. No significant cardiac enlargement or pericardial effusion. 4. Moderate coronary artery calcification is present. 5. No rib fracture identified. Lungs/pleura: 1. A small pneumothorax is seen at the left lung base anteriorly. This measures maximum only approximately 8 mm along the anterior and lateral lower pleural margin. Below the anterior lung base, into the anterior sulcus this measures 17 mm. This does extend over the apex, and on coronal projection measures 11 mm at the apex. 2. There is severe diffuse pulmonary emphysema. No acute or focal airspace disease is seen. 3. Calcified granulomatous type nodule is seen right upper lung posterior and medial unchanged. 4. Multiple areas of pleural plaque or pleural thickening are seen accentuated with the trace pneumothorax. 5. Soft Tissues/Bones: Old T10 compression fracture unchanged. Patient has a extreme paucity of subcutaneous fat. Abdomen/Pelvis: Organs; 1. Liver: The density is unremarkable with multiple small areas of ill-defined low-density. 2. Gallbladder: The gallbladder is contracted. There is no biliary dilation. 3. Spleen: Normal. 4. Pancreas: Normal. 5. Kidneys: There is dilation of the renal pelvis bilaterally greater on the right. There is no significant dilation however of the calices nor is there delayed function.   This may represent significant bilateral dilation such as chronic UPJ narrowing and has increased from prior study. No ureteral calculi seen. 6. Adrenal glands: Normal. GI/Bowel: There is no distention, obstruction or significant inflammatory change. There is formed stool throughout the colon. Pelvis: Urinary bladder is unremarkable. There is no free pelvic fluid. Peritoneum/Retroperitoneum: There is no mass or adenopathy. Soft Tissues/Bones:  A significant paucity of intra-abdominal and subcutaneous fat is seen. This results in poor separation of intra-abdominal structures and bowel loops. Small spontaneous pneumothorax on the left. Ill-defined contiguous soft tissue mass subcarinal and extending to the left hilum and partially surrounding the anterior aspect of the descending aorta. This is moderately suspicious for neoplasm such as infiltrating lymphoma Severe diffuse emphysema unchanged. Dilated renal pelvis bilaterally greater on the right. This could represent UPJ stricture or obstruction and Findings were discussed with ERIC JONES at 4:40 pm on 9/30/2020. Echocardiogram 7/2/2019:  Technically difficult examination. Low acoustic window secondary to COPD. The left ventricular systolic function is mildly reduced with an ejection fraction of 40 %. There is akinesis/aneurysm of the basal/mid inferior, basal/mid septal, and basal/mid inferolateral walls. Grade I diastolic dysfunction with normal left ventricular filling pressure. PFT 3/8/2016:  1. Spirometry revealed evidence of moderate obstructive defect. The FEV-1  is 1.25 liters which is 60% of predicted. No significant response to  bronchodilators. The FEV-1/FVC ratio is 53%. 2.  Lung volumes revealed normal total lung capacity at 5.32 liters, which is  91% of predicted. 3.  Diffusion capacity is severely decreased at 7.05, which is 31% of  predicted. 4.  Flow volume loops are consistent with obstructive defect. Assessment and plan:  Subcarinal mass. Images were shown to the patient.   I would agree the differential includes lymphoma, but small cell lung cancer is in the differential.  This is certainly a neoplastic process given the imaging findings and his weight loss. I recommended bronchoscopy with EBUS. The procedure was discussed in detail with the patient. He agrees to proceed. Alternatives to bronchoscopy and potential complications were discussed. Since the procedure could not be completed today due to conflict from anesthesia, it has been scheduled tomorrow with Dr. Leanne Gates  Pneumothorax. Small, follow-up chest x-ray did not show any expansion. Severe emphysema. Informed the patient about this. Needs outpatient follow-up and treatment. On Bevespi  CAD, hyperlipidemia, DJD. Per IM  DVT prophylaxis. Lovenox. Hold until procedures completed. I have examined the patient, reviewed labs, images and medical records. My impression and recommendations are as above. Discussed with the patient. Discussed with Melony Andujar, patient and nursing. Thank you for the consultation, I will follow with you.         Electronically signed by:  Lorena Gilmore MD    10/1/2020    7:50 PM.

## 2020-09-30 NOTE — ED TRIAGE NOTES
Patient identified as a positive fall risk on the ED triage fall screening. Patient placed in fall precautions which includes:  yellow fall risk bracelet on wrist, non skid shoes on feet, \"Be Safe\" sign placed on patient's door, and bed alarm placed under patient/alarm turned on. Patient instructed on importance of not getting out of bed or ambulating without assistance for safety.

## 2020-09-30 NOTE — ED NOTES

## 2020-09-30 NOTE — ED NOTES
[facility] [id number]  Per Tia Sanchez NP  RE  admission   @[address] [facility] [address]  09/30/20 [id number]

## 2020-09-30 NOTE — ED PROVIDER NOTES
SKIN LESION EXCISION Right     arm    SHOULDER ARTHROSCOPY Right     SHOULDER SURGERY Left     SPINE SURGERY  2006    Lumbar Disc    UPPER GASTROINTESTINAL ENDOSCOPY  10/12/2015    Hiatal Hernia.   Small Bowel Biopsy Negative     Family History   Problem Relation Age of Onset    Arthritis Mother     Other Mother         PN    Heart Disease Father      Social History     Socioeconomic History    Marital status:      Spouse name: Not on file    Number of children: Not on file    Years of education: Not on file    Highest education level: Not on file   Occupational History    Not on file   Social Needs    Financial resource strain: Not on file    Food insecurity     Worry: Not on file     Inability: Not on file    Transportation needs     Medical: Not on file     Non-medical: Not on file   Tobacco Use    Smoking status: Current Every Day Smoker     Packs/day: 1.00     Years: 66.00     Pack years: 66.00     Types: Cigarettes    Smokeless tobacco: Never Used   Substance and Sexual Activity    Alcohol use: No     Alcohol/week: 0.0 standard drinks    Drug use: No    Sexual activity: Yes     Partners: Female   Lifestyle    Physical activity     Days per week: Not on file     Minutes per session: Not on file    Stress: Not on file   Relationships    Social connections     Talks on phone: Not on file     Gets together: Not on file     Attends Mu-ism service: Not on file     Active member of club or organization: Not on file     Attends meetings of clubs or organizations: Not on file     Relationship status: Not on file    Intimate partner violence     Fear of current or ex partner: Not on file     Emotionally abused: Not on file     Physically abused: Not on file     Forced sexual activity: Not on file   Other Topics Concern    Not on file   Social History Narrative    Not on file     Current Facility-Administered Medications   Medication Dose Route Frequency Provider Last Rate Last Dose    0.9 % sodium chloride bolus  500 mL Intravenous Once BURAK Lew  mL/hr at 09/30/20 1353 500 mL at 09/30/20 1353     Current Outpatient Medications   Medication Sig Dispense Refill    atorvastatin (LIPITOR) 80 MG tablet TAKE 1 TABLET BY MOUTH EVERY DAY. For high cholesterol 90 tablet 2    Tiotropium Bromide-Olodaterol 2.5-2.5 MCG/ACT AERS Inhale 2 puffs into the lungs daily 1 Inhaler 5    pantoprazole (PROTONIX) 20 MG tablet Take 20 mg by mouth daily      aspirin 81 MG tablet Take 81 mg by mouth daily. No Known Allergies    REVIEW OF SYSTEMS  10 systems reviewed, pertinent positives per HPI otherwise noted to be negative    PHYSICAL EXAM  BP (!) 129/57   Pulse 66   Temp 97.6 °F (36.4 °C) (Oral)   Resp 16   Ht 5' 3\" (1.6 m)   Wt 90 lb (40.8 kg)   SpO2 94%   BMI 15.94 kg/m²   GENERAL APPEARANCE: Awake and alert. Cooperative. No acute distress. Vital signs are stable. Cachetic. HEAD: Normocephalic. Atraumatic. EYES: PERRL. EOM's grossly intact. ENT: Mucous membranes are dry. NECK: Supple. Normal ROM. HEART: RRR. Distal pulses are equal and intact. Cap refill less than 2 seconds. LUNGS: Respirations unlabored. CTAB. Good air exchange. Speaking comfortably in full sentences. No wheezing, rhonchi, rales, stridor. ABDOMEN: Soft. Non-distended. Non-tender. No guarding or rebound. No masses. No organomegaly. No rigidity. Bowel sounds are present. Negative carballo's. Negative McBurney's point. Negative CVA tenderness. Left lateral flank tenderness reproduced just above the hip. No bony tenderness to the ribs or hip. No rash. No ecchymosis or erythema. EXTREMITIES: No peripheral edema. Moves all extremities equally. All extremities neurovascularly intact. SKIN: Warm and dry. No acute rashes. NEUROLOGICAL: Alert and oriented. No gross facial drooping. Strength 5/5, sensation intact. PSYCHIATRIC: Normal mood and affect.     SCREENINGS       RADIOLOGY  Ct Chest Abdomen Pelvis W Contrast    Result Date: 9/30/2020  EXAMINATION: CT OF THE CHEST, ABDOMEN, AND PELVIS WITH CONTRAST 9/30/2020 3:38 pm TECHNIQUE: CT of the chest, abdomen and pelvis was performed with the administration of intravenous contrast. Multiplanar reformatted images are provided for review. Dose modulation, iterative reconstruction, and/or weight based adjustment of the mA/kV was utilized to reduce the radiation dose to as low as reasonably achievable. COMPARISON: Chest CT PA, 01/19/2016. Chest abdomen pelvic CT, 05/21/2015. HISTORY: ORDERING SYSTEM PROVIDED HISTORY: left flank pain TECHNOLOGIST PROVIDED HISTORY: Reason for exam:->left flank pain Additional Contrast?->None Reason for Exam: left flank pain Acuity: Acute Type of Exam: Initial CHEST: Mediastinum: 1. There is moderate soft tissue density posterior to the left hilum. This extends to and is inseparable from the anterior aspect of the descending thoracic aorta. Greatest extension from medial to lateral is 5 cm and 1.8 cm anterior-posterior. The the anterior wall of the thoracic aorta is not visible. There is no contrast extravasation or hemorrhage. There is some increased vascularity in this region. The longitudinal extension is over 4 cm from superior to inferior. This is new from the prior study. 2. Small lymph nodes are seen in the right hilum and paratracheal space superiorly, similar to the prior study. 3. No significant cardiac enlargement or pericardial effusion. 4. Moderate coronary artery calcification is present. 5. No rib fracture identified. Lungs/pleura: 1. A small pneumothorax is seen at the left lung base anteriorly. This measures maximum only approximately 8 mm along the anterior and lateral lower pleural margin. Below the anterior lung base, into the anterior sulcus this measures 17 mm. This does extend over the apex, and on coronal projection measures 11 mm at the apex. 2. There is severe diffuse pulmonary emphysema.   No acute or focal airspace disease is seen. 3. Calcified granulomatous type nodule is seen right upper lung posterior and medial unchanged. 4. Multiple areas of pleural plaque or pleural thickening are seen accentuated with the trace pneumothorax. 5. Soft Tissues/Bones: Old T10 compression fracture unchanged. Patient has a extreme paucity of subcutaneous fat. Abdomen/Pelvis: Organs; 1. Liver: The density is unremarkable with multiple small areas of ill-defined low-density. 2. Gallbladder: The gallbladder is contracted. There is no biliary dilation. 3. Spleen: Normal. 4. Pancreas: Normal. 5. Kidneys: There is dilation of the renal pelvis bilaterally greater on the right. There is no significant dilation however of the calices nor is there delayed function. This may represent significant bilateral dilation such as chronic UPJ narrowing and has increased from prior study. No ureteral calculi seen. 6. Adrenal glands: Normal. GI/Bowel: There is no distention, obstruction or significant inflammatory change. There is formed stool throughout the colon. Pelvis: Urinary bladder is unremarkable. There is no free pelvic fluid. Peritoneum/Retroperitoneum: There is no mass or adenopathy. Soft Tissues/Bones:  A significant paucity of intra-abdominal and subcutaneous fat is seen. This results in poor separation of intra-abdominal structures and bowel loops. Small spontaneous pneumothorax on the left. Ill-defined contiguous soft tissue mass subcarinal and extending to the left hilum and partially surrounding the anterior aspect of the descending aorta. This is moderately suspicious for neoplasm such as infiltrating lymphoma Severe diffuse emphysema unchanged. Dilated renal pelvis bilaterally greater on the right. This could represent UPJ stricture or obstruction and Findings were discussed with ERIC JONES at 4:40 pm on 9/30/2020.        CONSULTS  IP CONSULT TO PULMONOLOGY  IP CONSULT TO HOSPITALIST    ED COURSE/MDM  Patient seen and evaluated. Old records reviewed. Diagnostic testing reviewed and results discussed. I have seen this patient in collaboration with supervising physician Dr. Vinicius Donato. We thoroughly discussed the history, physical exam, diagnostic testing and emergency department course. Semaj Garcia presented to the ED today with above noted complaints. Emergency department course is notable for a small subcutaneous pneumothorax on the left. They also noted an ill-defined contiguous soft tissue mass subcarinal and extending to the left hilum and partially surrounding the anterior aspect of the descending aorta. Moderately suspicious for neoplasm. They also note a dilated renal pelvis bilaterally greater on the right this could represent UPJ stricture or obstruction. Patient was placed on supplemental oxygen for small pneumothorax. Patient no acute respiratory distress. Patient was given Flexeril for his left flank pain with good relief. Remainder of her emergency department course is unremarkable urinalysis is negative for infection or hematuria. CBC and CMP show no leukocytosis, bandemia, anemia, acute kidney injury, electrolyte abnormality. Troponin less than 0.01. EKG interpreted by my attending physician. LFTs and lipase unremarkable. He spoke with pulmonology who recommended hospital admission for bronchoscopy tomorrow for further evaluation of new mass and to follow pneumothorax resolution. Patient is agreeable with this plan. While in ED patient received   Medications   0.9 % sodium chloride bolus (500 mLs Intravenous New Bag 9/30/20 1353)   cyclobenzaprine (FLEXERIL) tablet 10 mg (10 mg Oral Given 9/30/20 1353)       A discussion was had with the patient and/or their surrogate regarding diagnosis, diagnostic testing results, treatment/ plan of care.  There was shared decision-making between myself as well as the patient and/or their surrogate and we are all in agreement with hospital admission. .  There was an opportunity for questions and all questions were answered to the best of my ability and to the satisfaction of the patient and/or patient family.      Results for orders placed or performed during the hospital encounter of 09/30/20   CBC auto differential   Result Value Ref Range    WBC 8.8 4.0 - 11.0 K/uL    RBC 4.78 4.20 - 5.90 M/uL    Hemoglobin 14.5 13.5 - 17.5 g/dL    Hematocrit 43.9 40.5 - 52.5 %    MCV 92.0 80.0 - 100.0 fL    MCH 30.3 26.0 - 34.0 pg    MCHC 32.9 31.0 - 36.0 g/dL    RDW 14.2 12.4 - 15.4 %    Platelets 117 214 - 791 K/uL    MPV 8.1 5.0 - 10.5 fL    Neutrophils % 67.9 %    Lymphocytes % 16.9 %    Monocytes % 8.6 %    Eosinophils % 5.7 %    Basophils % 0.9 %    Neutrophils Absolute 6.0 1.7 - 7.7 K/uL    Lymphocytes Absolute 1.5 1.0 - 5.1 K/uL    Monocytes Absolute 0.8 0.0 - 1.3 K/uL    Eosinophils Absolute 0.5 0.0 - 0.6 K/uL    Basophils Absolute 0.1 0.0 - 0.2 K/uL   Comprehensive metabolic panel   Result Value Ref Range    Sodium 137 136 - 145 mmol/L    Potassium 5.3 (H) 3.5 - 5.1 mmol/L    Chloride 99 99 - 110 mmol/L    CO2 27 21 - 32 mmol/L    Anion Gap 11 3 - 16    Glucose 87 70 - 99 mg/dL    BUN 20 7 - 20 mg/dL    CREATININE 0.8 0.8 - 1.3 mg/dL    GFR Non-African American >60 >60    GFR African American >60 >60    Calcium 9.5 8.3 - 10.6 mg/dL    Total Protein 7.9 6.4 - 8.2 g/dL    Alb 4.5 3.4 - 5.0 g/dL    Albumin/Globulin Ratio 1.3 1.1 - 2.2    Total Bilirubin 0.5 0.0 - 1.0 mg/dL    Alkaline Phosphatase 61 40 - 129 U/L    ALT 10 10 - 40 U/L    AST 27 15 - 37 U/L    Globulin 3.4 g/dL   Urinalysis, reflex to microscopic   Result Value Ref Range    Color, UA Yellow Straw/Yellow    Clarity, UA Clear Clear    Glucose, Ur Negative Negative mg/dL    Bilirubin Urine Negative Negative    Ketones, Urine Negative Negative mg/dL    Specific Gravity, UA 1.020 1.005 - 1.030    Blood, Urine Negative Negative    pH, UA 6.5 5.0 - 8.0    Protein, UA Negative Negative mg/dL Urobilinogen, Urine 1.0 <2.0 E.U./dL    Nitrite, Urine Negative Negative    Leukocyte Esterase, Urine Negative Negative    Microscopic Examination Not Indicated     Urine Type NotGiven    Lipase   Result Value Ref Range    Lipase 30.0 13.0 - 60.0 U/L   Troponin   Result Value Ref Range    Troponin <0.01 <0.01 ng/mL       I spoke with Dr. Bellamy Sportsman. We thoroughly discussed the history, physical exam, laboratory and imaging studies, as well as, emergency department course. Based upon that discussion, we've decided to admit Alwin Buerger for further observation and evaluation of Geoff Khanhauser's abdominal pain. As I have deemed necessary from their history, physical and studies, I have considered and evaluated Alwin Buerger for the following diagnoses:  ACUTE APPENDICITIS, BOWEL OBSTRUCTION, CHOLECYSTITIS, DIVERTICULITIS, INCARCERATED HERNIA, PANCREATITIS, or PERFORATED BOWEL or ULCER. FINAL IMPRESSION  1. Pneumothorax on left    2. Pulmonary emphysema, unspecified emphysema type ([address]    3. Mass in chest        Vitals:  Blood pressure (!) 150/80, pulse 65, temperature 98.2 °F (36.8 °C), temperature source Oral, resp. rate 20, height 5' 3\" (1.6 m), weight 90 lb (40.8 kg), SpO2 94 %. DISPOSITION  Patient was admitted to the hospital in stable condition. Comment: Please note this report has been produced using speech recognition software and may contain errors related to that system including errors in grammar, punctuation, and spelling, as well as words and phrases that may be inappropriate. If there are any questions or concerns please feel free to contact the dictating provider for clarification.             [id number] Kimberly Patel, APRN - CNP  09/30/20 [id number]

## 2020-10-01 NOTE — PLAN OF CARE
[place] [facility] / Cross-Cover Note  Dr. Grisel Adan MD    Patient presented from home complaining of pain along his left flank. He was found to have a subcarinal and left hilar mass. Dr. Sherif Wiggins is aware of the patient and bronchoscopic biopsy is planned. The patient relayed through his nurse overnight that he wanted his code status to be DNR-CCA.

## 2020-10-01 NOTE — CARE COORDINATION
CASE MANAGEMENT INITIAL ASSESSMENT      Reviewed chart and completed assessment  with:patient  Explained Case Management role/services. Primary contact information:Belen Wagner    Health Care Decision Maker:  Who do you trust or have selected to make healthcare decisions for you? Name:   Lakisha Merino  Phone  Number: [phone]  Can this person be reached and be able to respond quickly, such as within a few minutes or hours? yes  Who would be your back-up decision maker? Name Eric Arteaga  Phone Number:[phone]    Admit date/status:9/30/20  Diagnosis:lung mass   Is this a Readmission?:  n    Insurance:[place]   Precert required for SNF: yes       3 night stay required: n    Living arrangements, Adls, care needs, prior to admission:lives in ranch style home with grandson    Katherin Alexander at home:  Walker__Cane__RTS__ BSC__Shower Chair__  02__ HHN__ CPAP__  BiPap__  Hospital Bed__ W/C___ Other__________    Services in the home and/or outpatient, prior to [address] provides weekly housekeeping    Dialysis Facility (if applicable)   · Name:  · Address:  · Dialysis Schedule:  · Phone:  · Fax:    PT/OT recs:none    Hospital Exemption Notification (HEN):needed for SNF    Barriers to discharge:none    Plan/comments:spoke with patient and daughter Estefania Clifford. Plans on returning home at discharge. Reports IPTA and drives. Denied any DCP needs. Reported a lot of family support. Following.  Rocael Samson RN       [facility] on chart for MD signature

## 2020-10-01 NOTE — PROGRESS NOTES
4 Eyes Skin Assessment     The patient is being assess for  Admission    I agree that 2 RN's have performed a thorough Head to Toe Skin Assessment on the patient. ALL assessment sites listed below have been assessed. Areas assessed by both nurses:   [x]   Head, Face, and Ears   [x]   Shoulders, Back, and Chest  [x]   Arms, Elbows, and Hands   [x]   Coccyx, Sacrum, and Ischum  [x]   Legs, Feet, and Heels        Does the Patient have Skin Breakdown?   No         Amilcar Prevention initiated:  NA   Wound Care Orders initiated:  NA      [facility] nurse consulted for Pressure Injury (Stage 3,4, Unstageable, DTI, NWPT, and Complex wounds):  NA      Nurse 1 eSignature: Electronically signed by Flor Grijalva RN on 10/1/20 at 5:33 AM EDT    **SHARE this note so that the co-signing nurse is able to place an eSignature**    Nurse 2 eSignature: Electronically signed by Destinee Shaw RN on 10/1/20 at 5:35 AM EDT

## 2020-10-01 NOTE — PROGRESS NOTES
RESPIRATORY THERAPY ASSESSMENT    Name:  [address] [place] [address] Record Number:  [id number]  Age: 80 y.o. Gender: male  : 1932  Today's Date:  2020  Room:  8173/7513-17    Assessment     Is the patient being admitted for a COPD or Asthma exacerbation? No   (If yes the patient will be seen every 4 hours for the first 24 hours and then reassessed)    Patient Admission Diagnosis      Allergies  No Known Allergies    Minimum Predicted Vital Capacity:     1000          Actual Vital Capacity:      1000              Pulmonary History:COPD  Home Oxygen Therapy:  room air  Home Respiratory Therapy:Tiotropium bromide/Olodaterol   Current Respiratory Therapy:  Bevespi, Albuterol PRN          Respiratory Severity Index(RSI)   Patients with orders for inhalation medications, oxygen, or any therapeutic treatment modality will be placed on Respiratory Protocol. They will be assessed with the first treatment and at least every 72 hours thereafter. The following severity scale will be used to determine frequency of treatment intervention.     Smoking History: Smoking History Less than 1ppd or less than 15 pack year = 1    Social History  Social History     Tobacco Use    Smoking status: Current Every Day Smoker     Packs/day: 1.00     Years: 68.00     Pack years: 68.00     Types: Cigarettes    Smokeless tobacco: Never Used   Substance Use Topics    Alcohol use: No     Alcohol/week: 0.0 standard drinks    Drug use: No       Recent Surgical History: None = 0  Past Surgical History  Past Surgical History:   Procedure Laterality Date    CARDIAC SURGERY      stent    COLONOSCOPY      COLONOSCOPY   IOct     Diverticulosis    CORONARY ARTERY BYPASS GRAFT      DIAGNOSTIC CARDIAC CATH LAB PROCEDURE      ENDOSCOPY, COLON, DIAGNOSTIC      ENDOSCOPY, COLON, DIAGNOSTIC  12 Oct 2015    biopsy    EYE SURGERY Bilateral     Cataract    HERNIA REPAIR      KNEE ARTHROSCOPY Bilateral     OTHER SURGICAL 8:45 PM    Respiratory Protocol Guidelines     1. Assessment and treatment by Respiratory Therapy will be initiated for medication and therapeutic interventions upon initiation of aerosolized medication. 2. Physician will be contacted for respiratory rate (RR) greater than 35 breaths per minute. Therapy will be held for heart rate (HR) greater than 140 beats per minute, pending direction from physician. 3. Bronchodilators will be administered via Metered Dose Inhaler (MDI) with spacer when the following criteria are met:  a. Alert and cooperative     b. HR < 140 bpm  c. RR < 30 bpm                d. Can demonstrate a 2-3 second inspiratory hold  4. Bronchodilators will be administered via Hand Held Nebulizer [facility]) to patients when ANY of the following criteria are met  a. Incognizant or uncooperative          b. Patients treated with HHN at Home        c. Unable to demonstrate proper use of MDI with spacer     d. RR > 30 bpm   5. Bronchodilators will be delivered via Metered Dose Inhaler (MDI), HHN, Aerogen to intubated patients on mechanical ventilation. 6. Inhalation medication orders will be delivered and/or substituted as outlined below. Aerosolized Medications Ordering and Administration Guidelines:    1. All Medications will be ordered by a physician, and their frequency and/or modality will be adjusted as defined by the patients Respiratory Severity Index (RSI) score. 2. If the patient does not have documented COPD, consider discontinuing anticholinergics when RSI is less than 9.  3. If the bronchospasm worsens (increased RSI), then the bronchodilator frequency can be increased to a maximum of every 4 hours. If greater than every 4 hours is required, the physician will be contacted. 4. If the bronchospasm improves, the frequency of the bronchodilator can be decreased, based on the patient's RSI, but not less than home treatment regimen frequency.   5. Bronchodilator(s) will be discontinued if patient has a RSI less than 9 and has received no scheduled or as needed treatment for 72  Hrs. Patients Ordered on a Mucolytic Agent:    1. Must always be administered with a bronchodilator. 2. Discontinue if patient experiences worsened bronchospasm, or secretions have lessened to the point that the patient is able to clear them with a cough. Anti-inflammatory and Combination Medications:    1. If the patient lacks prior history of lung disease, is not using inhaled anti-inflammatory medication at home, and lacks wheezing by examination or by history for at least 24 hours, contact physician for possible discontinuation.

## 2020-10-01 NOTE — H&P
Hospital Medicine History & Physical      PCP: Chuck Vazquez MD    Date of Admission: 9/30/2020    Date of Service: Pt seen/examined on 10/1/2020 and Admitted to Inpatient with expected LOS greater than two midnights due to medical therapy. Chief Complaint: Left flank pain      History Of Present Illness:      80 y.o. male who presented to [facility] with acute onset of left-sided flank and chest pain. In ER work-up patient was found with mild pneumothorax on left side and lung mass. Patient is complaining of cough and congestion, denies any fever, denies any nausea or vomiting. Patient is admitted for further evaluation management.     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     COPD (chronic obstructive pulmonary disease) (HCC)     Emphysema of lung (HCC)     Hyperlipidemia     Osteoarthritis     Pneumonia        Past Surgical History:          Procedure Laterality Date    CARDIAC SURGERY      stent    COLONOSCOPY      COLONOSCOPY  12 IOct 2015    Diverticulosis    CORONARY ARTERY BYPASS GRAFT  1990    DIAGNOSTIC CARDIAC CATH LAB PROCEDURE      ENDOSCOPY, COLON, DIAGNOSTIC      ENDOSCOPY, COLON, DIAGNOSTIC  12 Oct 2015    biopsy    EYE SURGERY Bilateral     Cataract    HERNIA REPAIR      KNEE ARTHROSCOPY Bilateral     OTHER SURGICAL HISTORY      patient states he had \"a stent put in from his heart to his digestive system\"    [place] REPAIR ROTATOR CUFF,ACUTE Left 9/4/2018    EXAM UNDER ANESTHESIA, VIDEO ARTHROSCOPY LEFT SHOULDER, MINI OPEN ROTATOR CUFF REPAIR, NEER ACROMIOPLASTY, POSSIBLE CHRISTIANO PROCEDURE performed by Gari Hammans, [place] at [id number] [address] [place] / [address] Right     arm    SHOULDER ARTHROSCOPY Right     SHOULDER SURGERY Left     SPINE SURGERY  2006    Lumbar Disc    UPPER GASTROINTESTINAL ENDOSCOPY 10/12/2015    Hiatal Hernia. Small Bowel Biopsy Negative       Medications Prior to Admission:      Prior to Admission medications    Medication Sig Start Date End Date Taking? Authorizing Provider   polyethyl glycol-propyl glycol 0.4-0.3 % (SYSTANE) 0.4-0.3 % ophthalmic solution INSTILL 1-2 DROPS IN [facility] EYE FOUR TIMES A DAY FOR DRY EYES 3/11/20  Yes Historical Provider, MD   bisacodyl (DULCOLAX) 5 MG EC tablet TAKE ONE TABLET BY MOUTH ONCE DAILY 10/15/19  Yes Historical Provider, MD   omeprazole (PRILOSEC) 20 MG delayed release capsule Take 40 mg by mouth daily   Yes Historical Provider, MD   Nutritional Supplements (ENSURE ORIGINAL PO) Take by mouth daily   Yes Historical Provider, MD   atorvastatin (LIPITOR) 80 MG tablet TAKE 1 TABLET BY MOUTH EVERY DAY. For high cholesterol 3/11/20  Yes Nj Pretty Day, MD   Tiotropium Bromide-Olodaterol 2.5-2.5 MCG/ACT AERS Inhale 2 puffs into the lungs daily 7/9/19  Yes BURAK Stanley - CNP   aspirin 81 MG tablet Take 81 mg by mouth daily. Yes Historical Provider, MD       Allergies:  Patient has no known allergies. Social History:      The patient currently lives at home    TOBACCO:   reports that he has been smoking cigarettes. He has a 68.00 pack-year smoking history. He has never used smokeless tobacco.  ETOH:   reports no history of alcohol use. E-Cigarettes Vaping or Juuling     Questions Responses    Vaping Use Never User    Start Date     Does device contain nicotine? Quit Date     Vaping Type             Family History:       Reviewed in detail and negative for DM, CAD, Cancer, CVA. Positive as follows:        Problem Relation Age of Onset    Arthritis Mother     Other Mother         PN    Heart Disease Father        REVIEW OF SYSTEMS:   Pertinent positives as noted in the HPI. All other systems reviewed and negative.     PHYSICAL EXAM PERFORMED:    /73   Pulse 86   Temp 97.4 °F (36.3 °C) (Oral)   Resp 18   Ht 5' 3\" (1.6 m)   Wt 87 lb 15.4 oz (39.9 kg)   SpO2 91%   BMI 15.58 kg/m²     General appearance:  No apparent distress, appears stated age and cooperative. HEENT:  Normal cephalic, atraumatic without obvious deformity. Pupils equal, round, and reactive to light. Extra ocular muscles intact. Conjunctivae/corneas clear. Neck: Supple, with full range of motion. No jugular venous distention. Trachea midline. Respiratory:  Normal respiratory effort. Clear to auscultation, bilaterally without Rales/Wheezes/Rhonchi. Cardiovascular:  Regular rate and rhythm with normal S1/S2 without murmurs, rubs or gallops. Abdomen: Soft, non-tender, non-distended with normal bowel sounds. Musculoskeletal:  No clubbing, cyanosis or edema bilaterally. Full range of motion without deformity. Skin: Skin color, texture, turgor normal.  No rashes or lesions. Neurologic:  Neurovascularly intact without any focal sensory/motor deficits. Cranial nerves: II-XII intact, grossly non-focal.  Psychiatric:  Alert and oriented, thought content appropriate, normal insight  Capillary Refill: Brisk,< 3 seconds   Peripheral Pulses: +2 palpable, equal bilaterally       Labs:     Recent Labs     09/30/20  1316 10/01/20  0546   WBC 8.8 10.0   HGB 14.5 13.7   HCT 43.9 41.8    247     Recent Labs     09/30/20  1316 10/01/20  0546    136   K 5.3* 4.2   CL 99 102   CO2 27 26   BUN 20 18   CREATININE 0.8 0.7*   CALCIUM 9.5 9.3     Recent Labs     09/30/20  1316   AST 27   ALT 10   BILITOT 0.5   ALKPHOS 61     No results for input(s): INR in the last 72 hours.   Recent Labs     09/30/20  1316   TROPONINI <0.01       Urinalysis:      Lab Results   Component Value Date    NITRU Negative 09/30/2020    BLOODU Negative 09/30/2020    SPECGRAV 1.020 09/30/2020    GLUCOSEU Negative 09/30/2020       Radiology:     CXR: I have reviewed the CXR with the following interpretation:   CT chest showing left-sided pneumothorax, left hilar mass  EKG:  I have reviewed the EKG with the following interpretation: NA  CT CHEST ABDOMEN PELVIS W CONTRAST   Final Result   Small spontaneous pneumothorax on the left. Ill-defined contiguous soft tissue mass subcarinal and extending to the left   hilum and partially surrounding the anterior aspect of the descending aorta. This is moderately suspicious for neoplasm such as infiltrating lymphoma      Severe diffuse emphysema unchanged. Dilated renal pelvis bilaterally greater on the right. This could represent   UPJ stricture or obstruction and      Findings were discussed with ERIC JONES at 4:40 pm on 9/30/2020. ASSESSMENT:    Active Hospital Problems    Diagnosis Date Noted    Lung mass [R91.8] 09/30/2020     Priority: High    Centrilobular emphysema (HCC) [J43.2]      Priority: High    CAD (coronary artery disease) [I25.10]      Priority: High    Pneumothorax on left [J93.9] 09/30/2020    Stage 3 severe COPD by GOLD classification ([address] [J44.9] 09/30/2020         PLAN:    Left-sided lung mass, unknown etiology, patient has a history of tobacco abuse, admit to [place] [address] telemetry, CT chest abnormal noted, pulmonary consulted, plan for bronchoscopic biopsy today. Emphysema, continue IBD, pulmonary consult. Left-sided pneumothorax, small, plan to continue monitor, pulmonary following. CAD with history of CABG, hemodynamically stable, monitor. DVT Prophylaxis: Lovenox  Diet: Diet NPO, After Midnight Exceptions are: Sips with Meds  Code Status: Full Code    PT/OT Eval Status: Not ordered    Dispo -2 to 4 days       Gaetano Zabala MD    Thank you Eitan Batista MD for the opportunity to be involved in this patient's care. If you have any questions or concerns please feel free to contact me at [phone].

## 2020-10-01 NOTE — ED PROVIDER NOTES
I independently performed a history and physical on [facility]. All diagnostic, treatment, and disposition decisions were made by myself in conjunction with the advanced practice provider. For further details of [facility] emergency department encounter, please see Shahab Lowe NP's documentation. Patient presents to the emergency department with left-sided flank pain for the last 3 days. He denies fevers, or chills. Pain is worse with deep breaths and movement. No recent trauma or injury. No lower extremity swelling or edema. No exertional symptoms. Physical exam: General: No acute distress. Head: Normocephalic/atraumatic. Heart: Regular rate and rhythm. Normal S1-S2. Lungs: Diffuse rhonchi without wheezing, rales. Normal work of breathing. Abdomen soft. Nontender nondistended. 1. Pneumothorax on left    2. Pulmonary emphysema, unspecified emphysema type ([address]    3.  Mass in chest               [facility], DO  09/30/20 2013       [facility], DO  09/30/20 2204

## 2020-10-01 NOTE — PROGRESS NOTES
Pt alert and oriented, VSS. Shift assessment completed and documented. Pt denies any needs at this time. Bed locked and in lowest position. Call light within reach. Will continue to monitor.

## 2020-10-02 PROBLEM — J44.9 PULMONARY CACHEXIA DUE TO CHRONIC OBSTRUCTIVE PULMONARY DISEASE (HCC): Status: ACTIVE | Noted: 2020-01-01

## 2020-10-02 PROBLEM — F17.200 CURRENT SMOKER: Status: ACTIVE | Noted: 2020-01-01

## 2020-10-02 PROBLEM — R64 PULMONARY CACHEXIA DUE TO CHRONIC OBSTRUCTIVE PULMONARY DISEASE (HCC): Status: ACTIVE | Noted: 2020-01-01

## 2020-10-02 NOTE — PROGRESS NOTES
Hospitalist Progress Note      PCP: Jacob Coy MD    Date of Admission: 9/30/2020    Chief Complaint: Left-sided pneumothorax, lung mass    Hospital Course: See H&P    Subjective:   Patient is up in bed, comfortable, not in distress. No new event overnight noted. Medications:  Reviewed    Infusion Medications   Scheduled Medications    atorvastatin  80 mg Oral Nightly    aspirin  81 mg Oral Daily    pantoprazole  40 mg Oral QAM AC    glycopyrrolate-formoterol  2 puff Inhalation BID     PRN Meds: carboxymethylcellulose PF, sodium chloride flush, potassium chloride **OR** potassium alternative oral replacement **OR** potassium chloride, magnesium sulfate, acetaminophen **OR** acetaminophen, promethazine **OR** ondansetron, melatonin ER, albuterol, ipratropium-albuterol      Intake/Output Summary (Last 24 hours) at 10/2/2020 1217  Last data filed at 10/2/2020 0855  Gross per 24 hour   Intake 490 ml   Output 450 ml   Net 40 ml       Physical Exam Performed:    /65   Pulse 86   Temp 98.2 °F (36.8 °C) (Oral)   Resp 16   Ht 5' 3\" (1.6 m)   Wt 83 lb 9.6 oz (37.9 kg)   SpO2 93%   BMI 14.81 kg/m²     General appearance: No apparent distress, appears stated age and cooperative. HEENT: Pupils equal, round, and reactive to light. Conjunctivae/corneas clear. Neck: Supple, with full range of motion. No jugular venous distention. Trachea midline. Respiratory:  Normal respiratory effort. Clear to auscultation, bilaterally without Rales/Wheezes/Rhonchi. Cardiovascular: Regular rate and rhythm with normal S1/S2 without murmurs, rubs or gallops. Abdomen: Soft, non-tender, non-distended with normal bowel sounds. Musculoskeletal: No clubbing, cyanosis or edema bilaterally. Full range of motion without deformity. Skin: Skin color, texture, turgor normal.  No rashes or lesions. Neurologic:  Neurovascularly intact without any focal sensory/motor deficits.  Cranial nerves: II-XII intact, grossly non-focal.  Psychiatric: Alert and oriented, thought content appropriate, normal insight  Capillary Refill: Brisk,< 3 seconds   Peripheral Pulses: +2 palpable, equal bilaterally       Labs:   Recent Labs     09/30/20  1316 10/01/20  0546 10/02/20  0551   WBC 8.8 10.0 14.3*   HGB 14.5 13.7 13.4*   HCT 43.9 41.8 40.2*    247 239     Recent Labs     09/30/20  1316 10/01/20  0546 10/02/20  0551    136 135*   K 5.3* 4.2 4.3   CL 99 102 101   CO2 27 26 24   BUN 20 18 22*   CREATININE 0.8 0.7* 0.7*   CALCIUM 9.5 9.3 9.1     Recent Labs     09/30/20  1316   AST 27   ALT 10   BILITOT 0.5   ALKPHOS 61     No results for input(s): INR in the last 72 hours. Recent Labs     09/30/20  1316   TROPONINI <0.01       Urinalysis:      Lab Results   Component Value Date    NITRU Negative 09/30/2020    BLOODU Negative 09/30/2020    SPECGRAV 1.020 09/30/2020    GLUCOSEU Negative 09/30/2020       Radiology:  CT CHEST ABDOMEN PELVIS W CONTRAST   Final Result   Small spontaneous pneumothorax on the left. Ill-defined contiguous soft tissue mass subcarinal and extending to the left   hilum and partially surrounding the anterior aspect of the descending aorta. This is moderately suspicious for neoplasm such as infiltrating lymphoma      Severe diffuse emphysema unchanged. Dilated renal pelvis bilaterally greater on the right. This could represent   UPJ stricture or obstruction and      Findings were discussed with ERIC JONES at 4:40 pm on 9/30/2020.                  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 Bronchoscopy was canceled due to high risk. Palliative care consulted.     Emphysema, continue IBD, pulmonary consult.     Left-sided pneumothorax, small, plan to continue monitor, pulmonary following.     CAD with history of CABG, hemodynamically stable, monitor.     DVT Prophylaxis: Lovenox  Diet: Diet NPO Time Specified Exceptions are: Sips with Meds  Code Status: Full Code    PT/OT Eval Status: Not ordered    Dispo -in 1 to 2 days    Toshia Puente MD

## 2020-10-02 NOTE — CONSULTS
Palliative Care Initial Note  Palliative Care Admit date:  10/2/2020    Advance Directives:  Reviewed the copy of pts Living Will in this EMR. Mr. Guillermina Jeter said he executed a HCPOA (not in this EMR) and he designated his dtr, Thee Wagner, as his healthcare proxy. Pt currently has a full code status. Writer elected for forego discussion around code wishes given the gravity of today's discussion. Additionally, pt wasn't keen on belaboring our discussion as he was eager to d/c home. Plan of care/goals:  Met @ [facility] w/ pt and a son. Mr. Guillermina Jeter and son able to verb good understanding of pts medical status and verb'd his wish to [facility] and live out my days. \"  He and son would prefer to forego subsequent hospitalizations and were receptive to discussion re: involving hospice. Although pt stated he had \"intended to call them (hospice) down the road,\" we discussed the benefit of involving them earlier than later. Pt and son very agreeable. Social/Spiritual:  Pt tearfully shared having lost his wife \"a month ago\" after >60 years of marriage. She  from dementia and pt was her primary care giver. He was extremely pleased w/ his [facility]'s hospice[facility], and would like to use them for himself. Plan:  Maintain full code for now. Ref called to [facility] who will f/u w/ pt at home. Palliative Performance Scale:   [] 60%  Amb reduced; Sig dz. Can't do hobbies/housework; Intake normal or reduced, Occasional assist; LOC full/confusion   [x] 50%  Mainly sit/lie; Extensive disease. Mainly assist, Intake normal or reduced; Occasional assist; LOC full/confusion   [] 40%  Mainly in bed; Extensive disease; Mainly assist; Intake normal or reduced; Occasional assist; LOC full/confusion   [] 30%  Bed bound, Extensive disease; Total care; Intake reduced; LOC full/confusion   [] 20%  Bed bound; Extensive disease; Total care;  Intake minimal; Drowsy/coma   [] 10%  Bed bound; Extensive disease;  Total care; Mouth care only; Drowsy/coma   []  0%   Death         Reason for consult:    _X_ Advance Care Planning  _X_ Transition of Care Planning  ___ Psychosocial/Spiritual Support  ___ Symptom Management                                                                                                    Vinnie Guzman RN

## 2020-10-02 NOTE — CARE COORDINATION
Chart reviewed. Care managed per pulmonology and IM. Plan for bronchoscopy today. O2 at . 5LNC, 93%. Per patient and daughter yesterday, [address] and denied any DCP needs. Will continue to follow post procedure for needs. Alexis Yost RN    Per nursing, will not be doing bronch. Palliative care consult pending for goals of care. Following. Alexis Yost RN      Patient seen by palliative care. Referral to [facility] for future assistance. Patient and family denied any further DCP needs.  Alexis Yost RN

## 2020-10-02 NOTE — PLAN OF CARE
Problem: Falls - Risk of:  Goal: Will remain free from falls  Description: Will remain free from falls  10/2/2020 0312 by Tomy Menendez RN  Outcome: Ongoing    Pt A&Ox4. Pt appropriately calls out for assistance. Nonskid slippers in use. Bed locked and in lowest position. Bed alarm is on. Call light and personal belongings within reach. Pt has remained free from falls this shift. Will continue to monitor.

## 2020-10-02 NOTE — PROGRESS NOTES
Shift assessment completed per documentation. Pt A&Ox4. VSS. Pt awake in bed. Pt denies pain and discomfort at this time. Bilateral lung sounds diminished. Pt has a frequent non-productive congested cough. Bed locked and in lowest position. Bed alarm on. Nonskid slippers on. Side rails up 2/4. Call light and personal belongings within reach. Will continue to monitor.

## 2020-10-02 NOTE — PROGRESS NOTES
HISTORY: ORDERING SYSTEM PROVIDED HISTORY: left flank pain TECHNOLOGIST PROVIDED HISTORY: Reason for exam:->left flank pain Additional Contrast?->None Reason for Exam: left flank pain Acuity: Acute Type of Exam: Initial CHEST: Mediastinum: 1. There is moderate soft tissue density posterior to the left hilum. This extends to and is inseparable from the anterior aspect of the descending thoracic aorta. Greatest extension from medial to lateral is 5 cm and 1.8 cm anterior-posterior. The the anterior wall of the thoracic aorta is not visible. There is no contrast extravasation or hemorrhage. There is some increased vascularity in this region. The longitudinal extension is over 4 cm from superior to inferior. This is new from the prior study. 2. Small lymph nodes are seen in the right hilum and paratracheal space superiorly, similar to the prior study. 3. No significant cardiac enlargement or pericardial effusion. 4. Moderate coronary artery calcification is present. 5. No rib fracture identified. Lungs/pleura: 1. A small pneumothorax is seen at the left lung base anteriorly. This measures maximum only approximately 8 mm along the anterior and lateral lower pleural margin. Below the anterior lung base, into the anterior sulcus this measures 17 mm. This does extend over the apex, and on coronal projection measures 11 mm at the apex. 2. There is severe diffuse pulmonary emphysema. No acute or focal airspace disease is seen. 3. Calcified granulomatous type nodule is seen right upper lung posterior and medial unchanged. 4. Multiple areas of pleural plaque or pleural thickening are seen accentuated with the trace pneumothorax. 5. Soft Tissues/Bones: Old T10 compression fracture unchanged. Patient has a extreme paucity of subcutaneous fat. Abdomen/Pelvis: Organs; 1. Liver: The density is unremarkable with multiple small areas of ill-defined low-density. 2. Gallbladder: The gallbladder is contracted.   There is no biliary dilation. 3. Spleen: Normal. 4. Pancreas: Normal. 5. Kidneys: There is dilation of the renal pelvis bilaterally greater on the right. There is no significant dilation however of the calices nor is there delayed function. This may represent significant bilateral dilation such as chronic UPJ narrowing and has increased from prior study. No ureteral calculi seen. 6. Adrenal glands: Normal. GI/Bowel: There is no distention, obstruction or significant inflammatory change. There is formed stool throughout the colon. Pelvis: Urinary bladder is unremarkable. There is no free pelvic fluid. Peritoneum/Retroperitoneum: There is no mass or adenopathy. Soft Tissues/Bones:  A significant paucity of intra-abdominal and subcutaneous fat is seen. This results in poor separation of intra-abdominal structures and bowel loops. Small spontaneous pneumothorax on the left. Ill-defined contiguous soft tissue mass subcarinal and extending to the left hilum and partially surrounding the anterior aspect of the descending aorta. This is moderately suspicious for neoplasm such as infiltrating lymphoma Severe diffuse emphysema unchanged. Dilated renal pelvis bilaterally greater on the right. This could represent UPJ stricture or obstruction and Findings were discussed with ERIC JONES at 4:40 pm on 9/30/2020. PFT 3/8/2016:  1.  Spirometry revealed evidence of moderate obstructive defect.  The FEV-1  is 1.25 liters which is 60% of predicted.  No significant response to  bronchodilators.  The FEV-1/FVC ratio is 53%. 2.  Lung volumes revealed normal total lung capacity at 5.32 liters, which is  91% of predicted.    3.  Diffusion capacity is severely decreased at 7.05, which is 31% of  predicted.   4.  Flow volume loops are consistent with obstructive defect.       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 *          Plan:     Subcarinal mass. Images were shown to the patient. I would agree the differential includes lymphoma, but small cell lung cancer is in the differential.   Oxygen supplementation, if required, to keep saturation being 90 to 94% only  Pulmonary toilet  Patient was scheduled for a bronchoscopy with endobronchial ultrasound and needle biopsy by Dr. Dara Pitt this afternoon  Patient and the son were told about the procedure along with the pros and cons in detail  Patient and son were also shown the CT of the chest once again along with findings, differential diagnosis and implications  There is a high possibility that the subcarinal mass is malignant in nature as discussed by Dr. Markie Thakur with the patient yesterday  Patient is not a candidate for any surgery even if it is malignant given the extent of his COPD and pulmonary emphysema and his functional status   Risk to benefits discussed-patient after discussing with his family has decided -not to proceed with with the procedure which was conveyed to Dr Dara Pitt and endoscopy team    Current smoker -NRT ,if patient wants   Pneumothorax. Small, follow-up chest x-ray did not show any expansion. Severe emphysema. Informed the patient about this. Needs outpatient follow-up and treatment. On Bevespi  CAD, hyperlipidemia, DJD. Per IM  Pulmonary cachexia-Oral supplements as per metabolic support  DVT prophylaxis. Lovenox. Hold until procedures completed.     Patient's CODE STATUS was discussed and patient was DNR CC arrest before but is full code at this time and patient and family wants some guidance in terms of how to proceed with that and for that reason the palliative care consult was requested to establish the goals of care and code status     Case discussed with

## 2020-10-02 NOTE — DISCHARGE SUMMARY
without Rales/Wheezes/Rhonchi. Cardiovascular:  Regular rate and rhythm with normal S1/S2 without murmurs, rubs or gallops. Abdomen: Soft, non-tender, non-distended with normal bowel sounds. Musculoskeletal:  No clubbing, cyanosis or edema bilaterally. Full range of motion without deformity. Skin: Skin color, texture, turgor normal.  No rashes or lesions. Neurologic:  Neurovascularly intact without any focal sensory/motor deficits. Cranial nerves: II-XII intact, grossly non-focal.  Psychiatric:  Alert and oriented, thought content appropriate, normal insight  Capillary Refill: Brisk,< 3 seconds   Peripheral Pulses: +2 palpable, equal bilaterally       Labs: For convenience and continuity at follow-up the following most recent labs are provided:      CBC:    Lab Results   Component Value Date    WBC 14.3 10/02/2020    HGB 13.4 10/02/2020    HCT 40.2 10/02/2020     10/02/2020       Renal:    Lab Results   Component Value Date     10/02/2020    K 4.3 10/02/2020    K 4.4 12/25/2019     10/02/2020    CO2 24 10/02/2020    BUN 22 10/02/2020    CREATININE 0.7 10/02/2020    CALCIUM 9.1 10/02/2020         Significant Diagnostic Studies    Radiology:   CT CHEST ABDOMEN PELVIS W CONTRAST   Final Result   Small spontaneous pneumothorax on the left. Ill-defined contiguous soft tissue mass subcarinal and extending to the left   hilum and partially surrounding the anterior aspect of the descending aorta. This is moderately suspicious for neoplasm such as infiltrating lymphoma      Severe diffuse emphysema unchanged. Dilated renal pelvis bilaterally greater on the right. This could represent   UPJ stricture or obstruction and      Findings were discussed with ERIC JONES at 4:40 pm on 9/30/2020.                 Consults:     IP CONSULT TO PULMONOLOGY  IP CONSULT TO HOSPITALIST  IP CONSULT TO PALLIATIVE CARE    Disposition:  Home with Hospice    Condition at Discharge: Stable    Discharge Instructions/Follow-up:  Hospice    Code Status:  Full Code     Activity: activity as tolerated    Diet: regular diet      Discharge Medications:     Current Discharge Medication List           Details   polyethyl glycol-propyl glycol 0.4-0.3 % (SYSTANE) 0.4-0.3 % ophthalmic solution INSTILL 1-2 DROPS IN EACH EYE FOUR TIMES A DAY FOR DRY EYES      bisacodyl (DULCOLAX) 5 MG EC tablet TAKE ONE TABLET BY MOUTH ONCE DAILY      omeprazole (PRILOSEC) 20 MG delayed release capsule Take 40 mg by mouth daily      Nutritional Supplements (ENSURE ORIGINAL PO) Take by mouth daily      atorvastatin (LIPITOR) 80 MG tablet TAKE 1 TABLET BY MOUTH EVERY DAY. For high cholesterol  Qty: 90 tablet, Refills: 2      Tiotropium Bromide-Olodaterol 2.5-2.5 MCG/ACT AERS Inhale 2 puffs into the lungs daily  Qty: 1 Inhaler, Refills: 5    Associated Diagnoses: Chronic obstructive pulmonary disease, unspecified COPD type (HCC)      aspirin 81 MG tablet Take 81 mg by mouth daily. Time Spent on discharge is more than 30 minutes in the examination, evaluation, counseling and review of medications and discharge plan. Signed:    Stew Arauz MD   10/2/2020      Thank you Milbert Bernheim, MD for the opportunity to be involved in this patient's care. If you have any questions or concerns please feel free to contact me at [phone].

## 2020-10-03 NOTE — PROGRESS NOTES
Physician Progress Note      PATIENT:               Timothy Carlson  CSN #:                  [id number]  :                       1932  ADMIT DATE:       2020 12:56 PM  [address] DATE:        10/2/2020 2:45 PM  RESPONDING  PROVIDER #:        Solis Ervin MD          QUERY TEXT:    Dear Dr. Tripathi Staff,  Patient admitted with L lung mass, L sided pneumothorax. If possible, please   document in progress notes and discharge summary if you are evaluating and /or   treating any of the following: The medical record reflects the following:  Risk Factors: COPD, Lung mass, current smoker  Clinical Indicators: ED record describes pt as \"Cachectic\", presents with L   flank pain, Pulm consult note \"He has been somewhat frustrated that he has   been losing weight without known cause. He has been evaluated at the [facility], [facility].  He was at 132 pounds, is now down to 89   pounds. He is at reduced appetite\" in Pulm assessment \" appears frail, small   built, underweight and malnourished\" BMI=14.81, 5'3\", 83 lbs  Treatment: Daily wts, I and O  Thank you,  Remy Wang RN, CDS  [email or web address]  Options provided:  -- Protein calorie malnutrition severe  -- Protein calorie malnutrition mild  -- Protein calorie malnutrition moderate  -- Underweight with BMI 14.81  -- Other - I will add my own diagnosis  -- Disagree - Not applicable / Not valid  -- Disagree - Clinically unable to determine / Unknown  -- Refer to Clinical Documentation Reviewer    PROVIDER RESPONSE TEXT:    This patient has severe protein calorie malnutrition. Query created by:  [id number] SHILPA Lucas on 10/2/2020 2:04 PM      Electronically signed by:  Solis Ervin MD 10/3/2020 10:48 AM

## 2020-10-05 NOTE — TELEPHONE ENCOUNTER
[facility] Trans[facility] Initial Follow Up Call    Outreach made within 2 business days of discharge: Yes    Patient: Alwin Buerger Patient : 1932   MRN: <[id number]>  Reason for Admission: There are no discharge diagnoses documented for the most recent discharge. Discharge Date: 10/2/20       Spoke with: pt    Discharge department/facility: [facility]    [facility] Interactive Patient Contact:  Was patient able to fill all prescriptions: Yes  Was patient instructed to bring all medications to the follow-up visit: Yes  Is patient taking all medications as directed in the discharge summary? Yes  Does patient understand their discharge instructions: Yes  Does patient have questions or concerns that need addressed prior to 7-14 day follow up office visit: no    Scheduled appointment with PCP within 7-14 days    Follow Up  Future Appointments   Date Time Provider Thee Aguirre   2021  2:00 PM Abhi Wu MD [id number] [facility]   Pt states that he was told that there is nothing else they can try to do for his lung cancer. He said that they are going to try to keep him comfortable. He knows that he can call the office if he needs something we could help with.     Watson Monson MA

## 2021-01-01 ENCOUNTER — OFFICE VISIT (OUTPATIENT)
Dept: INTERNAL MEDICINE CLINIC | Age: 86
End: 2021-01-01
Payer: MEDICARE

## 2021-01-01 VITALS
WEIGHT: 94 LBS | DIASTOLIC BLOOD PRESSURE: 68 MMHG | BODY MASS INDEX: 16.65 KG/M2 | SYSTOLIC BLOOD PRESSURE: 122 MMHG | TEMPERATURE: 97.3 F | OXYGEN SATURATION: 95 % | HEART RATE: 80 BPM

## 2021-01-01 DIAGNOSIS — J44.9 STAGE 3 SEVERE COPD BY GOLD CLASSIFICATION (HCC): ICD-10-CM

## 2021-01-01 DIAGNOSIS — M62.838 MUSCLE SPASM: ICD-10-CM

## 2021-01-01 DIAGNOSIS — C34.00 MALIGNANT NEOPLASM OF HILUS OF LUNG, UNSPECIFIED LATERALITY (HCC): Primary | ICD-10-CM

## 2021-01-01 DIAGNOSIS — I25.10 CORONARY ARTERY DISEASE INVOLVING NATIVE CORONARY ARTERY OF NATIVE HEART WITHOUT ANGINA PECTORIS: ICD-10-CM

## 2021-01-01 DIAGNOSIS — D64.9 ANEMIA, UNSPECIFIED TYPE: ICD-10-CM

## 2021-01-01 PROCEDURE — 4004F PT TOBACCO SCREEN RCVD TLK: CPT | Performed by: INTERNAL MEDICINE

## 2021-01-01 PROCEDURE — 4040F PNEUMOC VAC/ADMIN/RCVD: CPT | Performed by: INTERNAL MEDICINE

## 2021-01-01 PROCEDURE — G8484 FLU IMMUNIZE NO ADMIN: HCPCS | Performed by: INTERNAL MEDICINE

## 2021-01-01 PROCEDURE — 99214 OFFICE O/P EST MOD 30 MIN: CPT | Performed by: INTERNAL MEDICINE

## 2021-01-01 PROCEDURE — G8427 DOCREV CUR MEDS BY ELIG CLIN: HCPCS | Performed by: INTERNAL MEDICINE

## 2021-01-01 PROCEDURE — G8926 SPIRO NO PERF OR DOC: HCPCS | Performed by: INTERNAL MEDICINE

## 2021-01-01 PROCEDURE — 1123F ACP DISCUSS/DSCN MKR DOCD: CPT | Performed by: INTERNAL MEDICINE

## 2021-01-01 PROCEDURE — 3023F SPIROM DOC REV: CPT | Performed by: INTERNAL MEDICINE

## 2021-01-01 PROCEDURE — G8419 CALC BMI OUT NRM PARAM NOF/U: HCPCS | Performed by: INTERNAL MEDICINE

## 2021-01-01 RX ORDER — PREDNISONE 10 MG/1
TABLET ORAL
COMMUNITY
Start: 2020-01-01

## 2021-01-01 RX ORDER — IPRATROPIUM BROMIDE AND ALBUTEROL SULFATE 2.5; .5 MG/3ML; MG/3ML
SOLUTION RESPIRATORY (INHALATION)
COMMUNITY
Start: 2020-01-01

## 2021-01-01 RX ORDER — LORAZEPAM 2 MG/ML
CONCENTRATE ORAL
COMMUNITY
Start: 2020-01-01

## 2021-01-01 RX ORDER — MORPHINE SULFATE 20 MG/ML
SOLUTION ORAL
COMMUNITY
Start: 2020-01-01

## 2021-01-01 ASSESSMENT — PATIENT HEALTH QUESTIONNAIRE - PHQ9
1. LITTLE INTEREST OR PLEASURE IN DOING THINGS: 0
SUM OF ALL RESPONSES TO PHQ9 QUESTIONS 1 & 2: 0
SUM OF ALL RESPONSES TO PHQ QUESTIONS 1-9: 0
SUM OF ALL RESPONSES TO PHQ QUESTIONS 1-9: 0

## 2021-01-07 PROBLEM — N40.0 BENIGN LOCALIZED HYPERPLASIA OF PROSTATE: Status: ACTIVE | Noted: 2021-01-01

## 2021-01-07 NOTE — PROGRESS NOTES
Rhonda Espino (:  1932) is a 80 y.o. male,Established patient, here for evaluation of the following chief complaint(s):  COPD, Hyperlipidemia, and Coronary Artery Disease      ASSESSMENT/PLAN:  1. Malignant neoplasm of hilus of lung, unspecified laterality ([facility] [address]       Consistent with lung neoplasm or lymphoma. Biopsy not obtained felt to be too risky. Patient has been on hospice for the last few weeks. 2. Stage 3 severe COPD by GOLD classification ([facility] [address]             Continue use of hand-held nebulizer. -     Comprehensive Metabolic Panel; Future    3. Muscle spasm      Involving his hands during the day legs and feet at night. Trial of magnesium with evening meal.    4. Coronary artery disease involving native coronary artery of native heart without angina pectoris              No complaint of chest pain. Continue to monitor.  -     Lipid Panel; Future    5. Anemia, unspecified type        Slight anemia  -     CBC; Future      Return in about 6 months (around 2021) for Lung Cancer  COPD Lipid  CAD. 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 Last office visit with me: 2020: Per telephone. Reviewed laboratory data 10/2/2020: Chemistry panel: Sodium: 135. BUN: 22.  Creatinine: 0.7. Glucose 131. CBC: WBC 14.3. Hemoglobin 13. 4. MAC at 40.2. Luis Armando Fallow 10/1/2020: Covid negative. 9/30/2020 seen at [facility] emergency room and admitted. Complained of left flank pain. Discharged 10/2/2020. Patient complains of weight loss. Work-up: CT scan abdomen/pelvis and chest.  Soft tissue mass subcarinal and extending to the left hilum and partially surrounding the anterior aspect of the descending aorta. Dilated renal pelvis right greater than left question of of a stricture versus obstruction. Severe diffuse emphysema. Bronchoscopy was initially suggested but then decided to be too risky. Patient states has been on hospice for 2 weeks. Patient complains of spasms about his hands during the day. Spasms about his legs and feet at night when trying to sleep. Concerning his COPD does have hand-held nebulizer machine at home. Shortness of breath with exertion grossly has become worse. Review of Systems  Review of Systems   Constitutional: negative   HENT: negative   EYES: negative   Respiratory: Severe COPD. Has HHN for home use  Gastrointestinal: negative   Endocrine: negative   Musculoskeletal: negative   Skin: negative   Allergic/Immunological: negative   Hematological: Lung hilar mass probable carcinoma. Unable to obtain biopsy specimen due to its location. Psychiatric/Behavorial: negative   CV: negative   CNS: negative   :Negative   S/E:Negative  Renal: Negative      Physical Exam   Head/neck: Ears: Normal TM. No obstruction. Throat: Mask. Not examined. Neck: No lymphadenopathy. Eyes: EOMI, PERRLA with no nystagmus  Lungs: Posterior scattered rhonchi with inspiration and expiration but no wheezing. CV: S1-S2 normal.   EDUARD murmur. Carotid: No bruit. Abdominal Examination: Bowel sounds present. Soft nontender. No mass no guarding or   rebound. Spine/extremities: No edema. No tenderness to palpation. Skin: No rash  CNS: Patient is alert, cooperative, moves all 4 limbs, ambulates without difficulty, light touch normal.     Good orientation. Blood pressure 122/68, pulse 80, temperature 97.3 °F (36.3 °C), temperature source Infrared, weight 94 lb (42.6 kg), SpO2 95 %. An electronic signature was used to authenticate this note.     --Tacho Tadeo MD

## 2021-02-21 NOTE — DISCHARGE INSTR - COC
ARTHROSCOPY Right     SHOULDER SURGERY Left     SPINE SURGERY  2006    Lumbar Disc    UPPER GASTROINTESTINAL ENDOSCOPY  10/12/2015    Hiatal Hernia.   Small Bowel Biopsy Negative       Immunization History:   Immunization History   Administered Date(s) Administered    Influenza Vaccine, unspecified formulation 10/06/2010, 10/01/2016    Influenza, High Dose (Fluzone 65 yrs and older) 2011, 10/02/2012, 10/09/2013, 10/30/2014, 2015, 2017, 10/11/2018    Pneumococcal Conjugate 13-valent ([id number]) 2015    Pneumococcal Polysaccharide (Yppoedieq30) 10/06/2010, 2017    Td, unspecified formulation 2015    Zoster Live (Zostavax) 2013       Active Problems:  Patient Active Problem List   Diagnosis Code    Hyperlipidemia E78.5    COPD (chronic obstructive pulmonary disease) ([facility] [address] J44.9    CAD (coronary artery disease) I25.10    Pneumonia J18.9    Weight loss R63.4    Hernia K46.9    Shoulder pain M25.519    Chest pain R07.9    COPD with acute exacerbation (HCC) J44.1    Tobacco dependence F17.200    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 M54.41, G89.29    Thyroid nodule E04.1    Constipation K59.00       Isolation/Infection:   Isolation          No Isolation            Nurse Assessment:  Last Vital Signs: /67   Pulse 65   Temp 98.2 °F (36.8 °C) (Oral)   Resp 16   Ht 5' 5\" (1.651 m)   Wt 97 lb 4.8 oz (44.1 kg)   SpO2 96%   BMI 16.19 kg/m²     Last documented pain score (0-10 scale): Pain Level: 0  Last Weight:   Wt Readings from Last 1 Encounters:   19 97 lb 4.8 oz (44.1 kg)     Mental Status:  {IP PT MENTAL STATUS:}    IV Access:  {[facility] IV ACCESS:675888336}    Nursing Mobility/ADLs:  Walking   {[facility] DME QHOO:962699280}  Transfer  {[facility] DME SCYO:540290920}  Bathing  {[facility] DME LLN}  Dressing  {[facility] DME PLMJ:063223406}  Toileting  {[facility] DME XFEO:995275239}  Feeding  {ELLI HAJI FXMB:964110971}  Med Admin  {P DME absent

## 2021-03-26 ENCOUNTER — TELEPHONE (OUTPATIENT)
Dept: INTERNAL MEDICINE CLINIC | Age: 86
End: 2021-03-26

## 2021-03-26 NOTE — TELEPHONE ENCOUNTER
Please arron pts chart. Sending to Dr. Anne Marie Navarro so he is aware. Prabhu Acosta notified do to card.

## 2024-11-14 NOTE — ED PROVIDER NOTES
Miguel A Bender   46 y.o.    @@  [facility]/Room: [id number]/Room/bed info not found    Subjective:   The patient is being seen for a routine clinic follow-up of chronic kidney disease. Recently, the disease has been stable. Disease complications:  No hyperkalemia, no hypocalcemia, no hyperphosphatemia, no metabolic acidosis, no coagulopathy, no uremic encephalopathy, no neuropathy and no renal osteodystrophy. The patient is currently asymptomatic. No associated symptoms are reported.       Meds:   Current Outpatient Medications   Medication Sig Dispense Refill    beta carotene (vitamin A) 3,000 mcg (10,000 unit) capsule Take 1 capsule (10,000 Units) by mouth once daily.      ergocalciferol (Vitamin D-2) 1.25 MG (76166 UT) capsule Take 1 capsule (1,250 mcg) by mouth 1 (one) time per week.      multivitamin tablet Take 1 tablet by mouth once daily.      nadolol (Corgard) 20 mg tablet Take 1 tablet (20 mg) by mouth once daily. 90 tablet 3     No current facility-administered medications for this visit.          ROS:  The patient is awake and oriented. No dizziness or lightheadedness. No chills and no fever. No headaches. No nausea and no vomiting. No shortness of breath. No cough. No sputum. No chest pain. No chest tightness. No abdominal pain. No diarrhea and no constipation. No hematemesis or hemoptysis. No hematuria. No rectal bleeding. No melena. No epistaxis. No urinary symptoms. No flank pain. No leg edema. No leg pain. No weakness. No itching. Overall, the rest of the review of systems is also negative.  12 point review of systems otherwise negative as stated in HPI.        Physical Examination:        Vitals:    11/14/24 0925   BP: 117/75   Pulse: 55     General: The patient is awake, oriented, and is not in any distress.  Head and Neck: Normocephalic. No periorbital edema.  Eyes: non-icteric  Respiratory: Symmetric air entry. Symmetric chest expansion.No respiratory distress.  Skin: No maculopapular  PN    Heart Disease Father      Social History     Socioeconomic History    Marital status:      Spouse name: Not on file    Number of children: Not on file    Years of education: Not on file    Highest education level: Not on file   Occupational History    Not on file   Social Needs    Financial resource strain: Not on file    Food insecurity:     Worry: Not on file     Inability: Not on file    Transportation needs:     Medical: Not on file     Non-medical: Not on file   Tobacco Use    Smoking status: Current Every Day Smoker     Packs/day: 1.00     Years: 66.00     Pack years: 66.00     Types: Cigarettes    Smokeless tobacco: Never Used   Substance and Sexual Activity    Alcohol use: No     Alcohol/week: 0.0 oz    Drug use: No    Sexual activity: Yes     Partners: Female   Lifestyle    Physical activity:     Days per week: Not on file     Minutes per session: Not on file    Stress: Not on file   Relationships    Social connections:     Talks on phone: Not on file     Gets together: Not on file     Attends Rastafarian service: Not on file     Active member of club or organization: Not on file     Attends meetings of clubs or organizations: Not on file     Relationship status: Not on file    Intimate partner violence:     Fear of current or ex partner: Not on file     Emotionally abused: Not on file     Physically abused: Not on file     Forced sexual activity: Not on file   Other Topics Concern    Not on file   Social History Narrative    Not on file     Current Facility-Administered Medications   Medication Dose Route Frequency Provider Last Rate Last Dose    aspirin tablet 325 mg  325 mg Oral Once Umair Hands, MD        nitroglycerin (NITRO-BID) 2 % ointment 0.5 inch  0.5 inch Topical Once Nondalton Hands, MD        Tiotropium Bromide-Olodaterol 2.5-2.5 MCG/ACT AERS 2 puff  2 puff Inhalation Daily Umair Hands, MD        pantoprazole (PROTONIX) tablet 20 mg  20 mg Oral Daily rash.  Musculoskeletal: No peripheral edema in both left and right upper extremities.  No edema in either left or right lower extremities.  Neuro Exam: Speech is fluent. Moves extremities.    Imaging:         Blood Labs:  No results found for this or any previous visit (from the past 24 hours).   Lab Results   Component Value Date    PHOS 7.3 (H) 07/27/2024      Lab Results   Component Value Date    GLUCOSE 86 11/12/2024    CALCIUM 9.2 11/12/2024     11/12/2024    K 4.5 11/12/2024    CO2 35 (H) 11/12/2024     11/12/2024    BUN 15 11/12/2024    CREATININE 1.24 11/12/2024         Assessment and Plan:  1 acute kidney injury.  The patient had a recent admission with cardiac arrest following which she developed acute kidney injury.  He required 3 dialysis treatments and later he was taken off dialysis.  His last creatinine level is down to 1.2.  The patient is doing great.  Normal potassium and bicarb level.    Overall stable from renal standpoint.  He will follow-up with his primary care physician and his cardiologist and I will see him as needed.             Abdon Villanueva MD  Senior Attending Physician  Director of Onco-Nephrology Program  Division of Nephrology & Hypertension  [facility]   Marcellus Ho MD        atorvastatin (LIPITOR) tablet 80 mg  1 tablet Oral Daily Marcellus Ho MD        aspirin tablet 81 mg  81 mg Oral Daily Marcellus Ho MD        sodium chloride flush 0.9 % injection 10 mL  10 mL Intravenous 2 times per day Marcellus Ho MD        sodium chloride flush 0.9 % injection 10 mL  10 mL Intravenous PRN Marcellus Ho MD        magnesium hydroxide (MILK OF MAGNESIA) 400 MG/5ML suspension 30 mL  30 mL Oral Daily PRN Marcellus Ho MD        ondansetron [facility]) injection 4 mg  4 mg Intravenous Q4H PRN Marcellus Ho MD        enoxaparin (LOVENOX) injection 40 mg  40 mg Subcutaneous Daily Marcellus Ho MD        acetaminophen (TYLENOL) tablet 650 mg  650 mg Oral Q4H PRN Marcellus Ho MD         No Known Allergies    REVIEW OF SYSTEMS  10 systems reviewed, pertinent positives per HPI otherwise noted to be negative. PHYSICAL EXAM  BP (!) 142/65   Pulse 59   Temp 97.7 °F (36.5 °C) (Oral)   Resp 18   Ht 5' 5\" (1.651 m)   Wt 97 lb 4.8 oz (44.1 kg)   SpO2 94%   BMI 16.19 kg/m²   GENERAL APPEARANCE: Awake and alert. Cooperative. No acute distress. HEAD: Normocephalic. Atraumatic. EYES: PERRL. EOM's grossly intact. ENT: Mucous membranes are moist.   NECK: Supple. Non-tender  HEART: RRR. LUNGS: Respirations unlabored. CTAB. ABDOMEN: Soft. Non-distended. Non-tender. No masses. No organomegaly. No guarding or rebound. BACK:  No midline Tenderness. EXTREMITIES: No peripheral edema. Moves all extremities equally. All extremities neurovascularly intact. SKIN: Warm and dry. No acute rashes. NEUROLOGICAL: Alert and oriented. CN's 2-12 intact. No gross facial drooping. Strength 5/5, sensation intact. PSYCHIATRIC: Normal mood and affect. LABS  I have reviewed all labs for this visit. The Ekg interpreted by me shows  normal sinus rhythm with a rate of 68  Axis is   Normal  QTc is  normal  Right bundle branch block.   Left posterior fascicular

## (undated) DEVICE — 4.5 MM FULL RADIUS STRAIGHT                                    BLADES, POWER/EP-1, YELLOW, PACKAGED                                    6 PER BOX, STERILE: Brand: DYONICS

## (undated) DEVICE — STANDARD HYPODERMIC NEEDLE,POLYPROPYLENE HUB: Brand: MONOJECT

## (undated) DEVICE — SOLUTION IRRIG 5L LAC R BG

## (undated) DEVICE — SUTURE MCRYL SZ 4-0 L18IN ABSRB UD L19MM PS-2 3/8 CIR PRIM Y496G

## (undated) DEVICE — PENCIL ES L3M BTTN SWCH S STL HEX LOK BLDE ELECTRD HOLSTER

## (undated) DEVICE — DRAPE,U/ SHT,SPLIT,PLAS,STERIL: Brand: MEDLINE

## (undated) DEVICE — SET ADMIN PRIMING 7ML L30IN 7.35LB 20 GTT 2ND RLER CLMP

## (undated) DEVICE — 90403 SHOULDER ARTHROSCOPY KIT: Brand: 90403 SHOULDER ARTHROSCOPY KIT

## (undated) DEVICE — KENDALL SCD EXPRESS SLEEVES, KNEE LENGTH, MEDIUM: Brand: KENDALL SCD

## (undated) DEVICE — 3M™ MEDIPORE™ SOFT CLOTH TAPE, 4 INCH X 10 YARDS, 12 ROLLS/CASE, 2964: Brand: 3M™ MEDIPORE™

## (undated) DEVICE — 3M™ STERI-DRAPE™ U-DRAPE, LONG 1019: Brand: STERI-DRAPE™

## (undated) DEVICE — 1810 FOAM BLOCK NEEDLE COUNTER: Brand: DEVON

## (undated) DEVICE — MEDI-VAC NON-CONDUCTIVE SUCTION TUBING: Brand: CARDINAL HEALTH

## (undated) DEVICE — TUBING PMP L8FT LNG W/ CONN FOR AR-6400 REDEUCE

## (undated) DEVICE — LIGHT HANDLE: Brand: DEVON

## (undated) DEVICE — CATHETER IV 20GA L1.25IN PNK FEP SFTY STR HUB RADPQ DISP

## (undated) DEVICE — SOLUTION IV 1000ML LAC RINGERS PH 6.5 INJ USP VIAFLX PLAS

## (undated) DEVICE — 3M™ TEGADERM™ TRANSPARENT FILM DRESSING FRAME STYLE, 1624W, 2-3/8 IN X 2-3/4 IN (6 CM X 7 CM), 100/CT 4CT/CASE: Brand: 3M™ TEGADERM™

## (undated) DEVICE — PACK PROCEDURE SURG ARTHSCP CUST

## (undated) DEVICE — INTENDED FOR TISSUE SEPARATION, AND OTHER PROCEDURES THAT REQUIRE A SHARP SURGICAL BLADE TO PUNCTURE OR CUT.: Brand: BARD-PARKER ® STAINLESS STEEL BLADES

## (undated) DEVICE — SET GRAV VENT NVENT CK VLV 3 NDL FREE PRT 10 GTT

## (undated) DEVICE — 3M™ STERI-STRIP™ REINFORCED ADHESIVE SKIN CLOSURES, R1547, 1/2 IN X 4 IN (12 MM X 100 MM), 6 STRIPS/ENVELOPE: Brand: 3M™ STERI-STRIP™

## (undated) DEVICE — 4.0 CM ACROMIOBLASTER STRAIGHT                                    BURRS, POWER/EP-1, SAGE GREEN, 8000                                    MAXIMUM RPM, PACKAGED 6 PER BOX, STERILE

## (undated) DEVICE — AMBIENT SUPER TURBOVAC 90: Brand: COBLATION

## (undated) DEVICE — STERILE POLYISOPRENE POWDER-FREE SURGICAL GLOVES: Brand: PROTEXIS

## (undated) DEVICE — FIRSTPASS ST SUTURE PASSER, SELF CAPTURE: Brand: FIRSTPASS

## (undated) DEVICE — SOLUTION IV IRRIG 500ML 0.9% SODIUM CHL 2F7123

## (undated) DEVICE — TOWEL,OR,DSP,ST,BLUE,STD,8/PK,10PK/CS: Brand: MEDLINE

## (undated) DEVICE — SUTURE VCRL SZ 0 L18IN ABSRB UD L36MM CT-1 1/2 CIR J840D

## (undated) DEVICE — SUTURE VCRL SZ 2-0 L18IN ABSRB UD CT-1 L36MM 1/2 CIR J839D

## (undated) DEVICE — SYRINGE BLB 50CC IRRIG PLIABLE FNGR FLNG GRAD FLSK DISP

## (undated) DEVICE — FLUID CONTROL SHOULDER DRAPE PACK: Brand: CONVERTORS

## (undated) DEVICE — SKIN MARKER,REGULAR TIP WITH RULER AND LABELS: Brand: DEVON

## (undated) DEVICE — CONVERTED USE 304929 SPONGE GAUZE CURITY 4X4IN 16-PLY ST

## (undated) DEVICE — GLOVE SURG SZ 65 L12IN FNGR THK94MIL STD WHT LTX FREE

## (undated) DEVICE — ELECTRODE PT RET AD L9FT HI MOIST COND ADH HYDRGEL CORDED

## (undated) DEVICE — CANNULA THREADED FLEX 8.0 X 72MM: Brand: CLEAR-TRAC

## (undated) DEVICE — ABDOMINAL PAD: Brand: DERMACEA

## (undated) DEVICE — FIRSTPASS NEEDLE AND SUTURE                                    CAPTURE, 5 PER BOX: Brand: FIRSTPASS